# Patient Record
Sex: FEMALE | Race: WHITE | Employment: UNEMPLOYED | URBAN - METROPOLITAN AREA
[De-identification: names, ages, dates, MRNs, and addresses within clinical notes are randomized per-mention and may not be internally consistent; named-entity substitution may affect disease eponyms.]

---

## 2019-10-30 ENCOUNTER — HOSPITAL ENCOUNTER (EMERGENCY)
Age: 44
Discharge: HOME | End: 2019-10-30
Attending: EMERGENCY MEDICINE
Payer: COMMERCIAL

## 2019-10-30 ENCOUNTER — APPOINTMENT (OUTPATIENT)
Dept: VASCULAR SURGERY | Age: 44
End: 2019-10-30
Attending: EMERGENCY MEDICINE
Payer: COMMERCIAL

## 2019-10-30 VITALS
HEART RATE: 67 BPM | TEMPERATURE: 98.5 F | BODY MASS INDEX: 29.44 KG/M2 | SYSTOLIC BLOOD PRESSURE: 98 MMHG | WEIGHT: 160 LBS | OXYGEN SATURATION: 98 % | RESPIRATION RATE: 20 BRPM | HEIGHT: 62 IN | DIASTOLIC BLOOD PRESSURE: 75 MMHG

## 2019-10-30 DIAGNOSIS — I82.622 ACUTE DEEP VEIN THROMBOSIS (DVT) OF LEFT UPPER EXTREMITY, UNSPECIFIED VEIN (HCC): Primary | ICD-10-CM

## 2019-10-30 PROCEDURE — 93971 EXTREMITY STUDY: CPT

## 2019-10-30 PROCEDURE — 99284 EMERGENCY DEPT VISIT MOD MDM: CPT

## 2019-10-30 NOTE — ED PROVIDER NOTES
The history is provided by the patient. No  was used. Arm Pain    This is a new problem. The current episode started more than 2 days ago. The problem occurs constantly. The problem has not changed since onset. The pain is present in the left arm. The pain is at a severity of 7/10. The pain is moderate. Pertinent negatives include no numbness, full range of motion, no stiffness, no tingling, no itching, no back pain and no neck pain. The symptoms are aggravated by movement, palpation and contact. She has tried nothing for the symptoms. The treatment provided no relief. There has been no history of extremity trauma. Past Medical History:   Diagnosis Date    DVT (deep venous thrombosis) (HCC)        Past Surgical History:   Procedure Laterality Date    HX GYN      tubal ligation         History reviewed. No pertinent family history.     Social History     Socioeconomic History    Marital status: Not on file     Spouse name: Not on file    Number of children: Not on file    Years of education: Not on file    Highest education level: Not on file   Occupational History    Not on file   Social Needs    Financial resource strain: Not on file    Food insecurity:     Worry: Not on file     Inability: Not on file    Transportation needs:     Medical: Not on file     Non-medical: Not on file   Tobacco Use    Smoking status: Never Smoker    Smokeless tobacco: Never Used   Substance and Sexual Activity    Alcohol use: Yes     Comment: occ    Drug use: Never    Sexual activity: Not on file   Lifestyle    Physical activity:     Days per week: Not on file     Minutes per session: Not on file    Stress: Not on file   Relationships    Social connections:     Talks on phone: Not on file     Gets together: Not on file     Attends Zoroastrianism service: Not on file     Active member of club or organization: Not on file     Attends meetings of clubs or organizations: Not on file     Relationship status: Not on file    Intimate partner violence:     Fear of current or ex partner: Not on file     Emotionally abused: Not on file     Physically abused: Not on file     Forced sexual activity: Not on file   Other Topics Concern    Not on file   Social History Narrative    Not on file         ALLERGIES: Aspirin and Nsaids (non-steroidal anti-inflammatory drug)    Review of Systems   Constitutional: Negative for activity change, chills and fever. HENT: Negative for nosebleeds, sore throat, trouble swallowing and voice change. Eyes: Negative for visual disturbance. Respiratory: Negative for shortness of breath. Cardiovascular: Negative for chest pain and palpitations. Gastrointestinal: Negative for abdominal pain, constipation, diarrhea and nausea. Genitourinary: Negative for difficulty urinating, dysuria, hematuria and urgency. Musculoskeletal: Positive for myalgias. Negative for back pain, neck pain, neck stiffness and stiffness. Skin: Negative for color change and itching. Allergic/Immunologic: Negative for immunocompromised state. Neurological: Negative for dizziness, tingling, seizures, syncope, weakness, light-headedness, numbness and headaches. Psychiatric/Behavioral: Negative for behavioral problems, confusion, hallucinations, self-injury and suicidal ideas. Vitals:    10/30/19 1637   BP: 130/76   Pulse: 67   Resp: 20   Temp: 98.5 °F (36.9 °C)   SpO2: 95%   Weight: 72.6 kg (160 lb)   Height: 5' 2\" (1.575 m)            Physical Exam   Constitutional: She appears well-developed and well-nourished. No distress. HENT:   Head: Atraumatic. Eyes: EOM are normal.   Neck: No tracheal deviation present. Cardiovascular: Normal rate, regular rhythm and normal heart sounds. Warm and well perfused   Pulmonary/Chest: Effort normal and breath sounds normal. No respiratory distress. Musculoskeletal: Normal range of motion.         Left upper arm: She exhibits tenderness, swelling and edema. She exhibits no bony tenderness. Neurological: She is alert. Coordination normal.   Skin: Skin is warm and dry. She is not diaphoretic. Psychiatric: She has a normal mood and affect. Her behavior is normal. Judgment and thought content normal.   Nursing note and vitals reviewed. MDM      This is a 41-year-old female with past medical history, review of systems, physical exam as above, presenting with complaints of atraumatic left arm pain. Patient states pain began approximately 1 week ago, followed shortly thereafter by swelling, and stating that his \"cool to the touch\". Patient endorses a previous history of DVT, thought provoked by pregnancy, 15 years ago, no longer taking anticoagulants. She denies chest pain or shortness of breath, or other symptoms at this time. Physical exam is remarkable for well-appearing middle-aged female, in no acute distress, with discolored left upper extremity, distal pulse motor and sensation intact, mildly tender to palpation proximal medial aspect. Suspect recurrent DVT, will obtain DVT ultrasound study, reevaluate, and make a disposition.     Procedures

## 2019-10-30 NOTE — ED TRIAGE NOTES
Triage: Monday noticed that her left arm was swollen. Left arm is significantly more swollen that right, cold to the touch arm and fingers. .  + pulses. Hx; blood clot not on anticoagulants.

## 2019-10-30 NOTE — DISCHARGE INSTRUCTIONS
Patient Education        Deep Vein Thrombosis: Care Instructions  Your Care Instructions    A deep vein thrombosis (DVT) is a blood clot in certain veins of the legs, pelvis, or arms. Blood clots in these veins need to be treated because they can get bigger, break loose, and travel through the bloodstream to the lungs. A blood clot in a lung can be life-threatening. The doctor may have given you a blood thinner (anticoagulant). A blood thinner can stop the blood clot from growing larger and prevent new clots from forming. You will need to take a blood thinner for 3 to 6 months or longer. The doctor has checked you carefully, but problems can develop later. If you notice any problems or new symptoms, get medical treatment right away. Follow-up care is a key part of your treatment and safety. Be sure to make and go to all appointments, and call your doctor if you are having problems. It's also a good idea to know your test results and keep a list of the medicines you take. How can you care for yourself at home? · Take your medicines exactly as prescribed. Call your doctor if you think you are having a problem with your medicine. · If you are taking a blood thinner, be sure you get instructions about how to take your medicine safely. Blood thinners can cause serious bleeding problems. · Wear compression stockings if your doctor recommends them. These stockings are tighter at the feet than on the legs. They may reduce pain and swelling in your legs. But there are different types of stockings, and they need to fit right. So your doctor will recommend what you need. · When you sit, use a pillow to raise the arm or leg that has the blood clot. Try to keep it above the level of your heart. When should you call for help? Call 911 anytime you think you may need emergency care.  For example, call if:    · You passed out (lost consciousness).     · You have symptoms of a blood clot in your lung (called a pulmonary embolism). These include:  ? Sudden chest pain. ? Trouble breathing. ? Coughing up blood.    Call your doctor now or seek immediate medical care if:    · You have new or worse trouble breathing.     · You are dizzy or lightheaded, or you feel like you may faint.     · You have symptoms of a blood clot in your arm or leg. These may include:  ? Pain in the arm, calf, back of the knee, thigh, or groin. ? Redness and swelling in the arm, leg, or groin.    Watch closely for changes in your health, and be sure to contact your doctor if:    · You do not get better as expected. Where can you learn more? Go to http://rosemarie-radha.info/. Enter L481 in the search box to learn more about \"Deep Vein Thrombosis: Care Instructions. \"  Current as of: September 26, 2018  Content Version: 12.2  © 5847-6307 [employer], [employer]. Care instructions adapted under license by [employer] (which disclaims liability or warranty for this information). If you have questions about a medical condition or this instruction, always ask your healthcare professional. [place] [address] any warranty or liability for your use of this information.

## 2019-11-02 ENCOUNTER — HOSPITAL ENCOUNTER (EMERGENCY)
Age: 44
Discharge: HOME | End: 2019-11-02
Attending: EMERGENCY MEDICINE
Payer: COMMERCIAL

## 2019-11-02 VITALS
RESPIRATION RATE: 18 BRPM | WEIGHT: 160 LBS | TEMPERATURE: 98.2 F | DIASTOLIC BLOOD PRESSURE: 70 MMHG | SYSTOLIC BLOOD PRESSURE: 120 MMHG | HEIGHT: 62 IN | BODY MASS INDEX: 29.44 KG/M2 | OXYGEN SATURATION: 98 % | HEART RATE: 71 BPM

## 2019-11-02 DIAGNOSIS — N92.0 MENORRHAGIA WITH REGULAR CYCLE: Primary | ICD-10-CM

## 2019-11-02 LAB
ALBUMIN SERPL-MCNC: 3.6 G/DL (ref 3.5–5)
ALBUMIN/GLOB SERPL: 0.9 {RATIO} (ref 1.1–2.2)
ALP SERPL-CCNC: 67 U/L (ref 45–117)
ALT SERPL-CCNC: 14 U/L (ref 12–78)
ANION GAP SERPL CALC-SCNC: 8 MMOL/L (ref 5–15)
AST SERPL-CCNC: 11 U/L (ref 15–37)
BASOPHILS # BLD: 0.1 K/UL (ref 0–0.1)
BASOPHILS NFR BLD: 1 % (ref 0–1)
BILIRUB SERPL-MCNC: 0.2 MG/DL (ref 0.2–1)
BUN SERPL-MCNC: 10 MG/DL (ref 6–20)
BUN/CREAT SERPL: 11 (ref 12–20)
CALCIUM SERPL-MCNC: 9.1 MG/DL (ref 8.5–10.1)
CHLORIDE SERPL-SCNC: 110 MMOL/L (ref 97–108)
CO2 SERPL-SCNC: 24 MMOL/L (ref 21–32)
COMMENT, HOLDF: NORMAL
CREAT SERPL-MCNC: 0.87 MG/DL (ref 0.55–1.02)
DIFFERENTIAL METHOD BLD: NORMAL
EOSINOPHIL # BLD: 0.2 K/UL (ref 0–0.4)
EOSINOPHIL NFR BLD: 2 % (ref 0–7)
ERYTHROCYTE [DISTWIDTH] IN BLOOD BY AUTOMATED COUNT: 13 % (ref 11.5–14.5)
GLOBULIN SER CALC-MCNC: 3.8 G/DL (ref 2–4)
GLUCOSE SERPL-MCNC: 94 MG/DL (ref 65–100)
HCT VFR BLD AUTO: 39.2 % (ref 35–47)
HGB BLD-MCNC: 13 G/DL (ref 11.5–16)
IMM GRANULOCYTES # BLD AUTO: 0 K/UL (ref 0–0.04)
IMM GRANULOCYTES NFR BLD AUTO: 0 % (ref 0–0.5)
LYMPHOCYTES # BLD: 2.5 K/UL (ref 0.8–3.5)
LYMPHOCYTES NFR BLD: 28 % (ref 12–49)
MCH RBC QN AUTO: 29.3 PG (ref 26–34)
MCHC RBC AUTO-ENTMCNC: 33.2 G/DL (ref 30–36.5)
MCV RBC AUTO: 88.3 FL (ref 80–99)
MONOCYTES # BLD: 0.6 K/UL (ref 0–1)
MONOCYTES NFR BLD: 7 % (ref 5–13)
NEUTS SEG # BLD: 5.6 K/UL (ref 1.8–8)
NEUTS SEG NFR BLD: 62 % (ref 32–75)
NRBC # BLD: 0 K/UL (ref 0–0.01)
NRBC BLD-RTO: 0 PER 100 WBC
PLATELET # BLD AUTO: 270 K/UL (ref 150–400)
PMV BLD AUTO: 9.2 FL (ref 8.9–12.9)
POTASSIUM SERPL-SCNC: 3.5 MMOL/L (ref 3.5–5.1)
PROT SERPL-MCNC: 7.4 G/DL (ref 6.4–8.2)
RBC # BLD AUTO: 4.44 M/UL (ref 3.8–5.2)
SAMPLES BEING HELD,HOLD: NORMAL
SODIUM SERPL-SCNC: 142 MMOL/L (ref 136–145)
WBC # BLD AUTO: 9 K/UL (ref 3.6–11)

## 2019-11-02 PROCEDURE — 36415 COLL VENOUS BLD VENIPUNCTURE: CPT

## 2019-11-02 PROCEDURE — 85025 COMPLETE CBC W/AUTO DIFF WBC: CPT

## 2019-11-02 PROCEDURE — 80053 COMPREHEN METABOLIC PANEL: CPT

## 2019-11-02 PROCEDURE — 99283 EMERGENCY DEPT VISIT LOW MDM: CPT

## 2019-11-02 NOTE — ED TRIAGE NOTES
Patient c/o heavy abdominal bleeding with clots x few days, recently started xeralto due to dvt in upper left extremity.

## 2019-11-02 NOTE — ED NOTES
12:56 PM  I have evaluated the patient as the Provider in Triage. I have reviewed Her vital signs and the triage nurse assessment. I have talked with the patient and any available family and advised that I am the provider in triage and have ordered the appropriate study to initiate their work up based on the clinical presentation during my assessment. I have advised that the patient will be accommodated in the Main ED as soon as possible. I have also requested to contact the triage nurse or myself immediately if the patient experiences any changes in their condition during this brief waiting period. Patient recently diagnosed with left upper extremity DVT and started on Xarelto. Now with increased vaginal bleeding with blood clots the size of eggs. Denies any symptoms of anemia.   Mary Sow, KAREEM

## 2019-11-02 NOTE — ED PROVIDER NOTES
38 y.o. female with past medical history significant for DVT and tubal ligation who presents from home with chief complaint of vaginal bleeding. Patient states ~3 days ago she was admitted at [facility] for DVT of left upper extremity, patient was discharged home with Xarelto. Patient notes Miles Rodriges few days before she began Xarelto she was spotting however related it to beginning her period a little early. \" Patient states ~3 days ago she had onset of vaginal bleeding that has not subsided. Patient presents to [facility] ED with persisting vaginal bleeding described as Barrera Baize, going through a maxi-pad every hour, and clot's as big as eggs. \" Patient notes her last DVT was provoked by pregnancy and was ~15 years ago. Patient denies recent sexual intercourse. Patient denies recent injury. Patient denies hx of sx. Patient denies SOB, lightheadedness, dizziness, and chest pain. There are no other acute medical concerns at this time. Social hx: No Tobacco, (+) EtOH use, No illicit drug use. Note written by Lukas Duffy, as dictated by Peter Vincent, DO 2:00 PM     The history is provided by the patient. No  was used. Past Medical History:   Diagnosis Date    DVT (deep venous thrombosis) (HCC)        Past Surgical History:   Procedure Laterality Date    HX GYN      tubal ligation         No family history on file.     Social History     Socioeconomic History    Marital status:      Spouse name: Not on file    Number of children: Not on file    Years of education: Not on file    Highest education level: Not on file   Occupational History    Not on file   Social Needs    Financial resource strain: Not on file    Food insecurity:     Worry: Not on file     Inability: Not on file    Transportation needs:     Medical: Not on file     Non-medical: Not on file   Tobacco Use    Smoking status: Never Smoker    Smokeless tobacco: Never Used   Substance and Sexual Activity    Alcohol use: Yes     Comment: occ    Drug use: Never    Sexual activity: Not on file   Lifestyle    Physical activity:     Days per week: Not on file     Minutes per session: Not on file    Stress: Not on file   Relationships    Social connections:     Talks on phone: Not on file     Gets together: Not on file     Attends Mandaeism service: Not on file     Active member of club or organization: Not on file     Attends meetings of clubs or organizations: Not on file     Relationship status: Not on file    Intimate partner violence:     Fear of current or ex partner: Not on file     Emotionally abused: Not on file     Physically abused: Not on file     Forced sexual activity: Not on file   Other Topics Concern    Not on file   Social History Narrative    Not on file         ALLERGIES: Aspirin and Nsaids (non-steroidal anti-inflammatory drug)    Review of Systems   Constitutional: Negative for appetite change, fatigue and fever. HENT: Negative for ear pain, sore throat and trouble swallowing. Eyes: Negative for pain, discharge and visual disturbance. Respiratory: Negative for cough, chest tightness, shortness of breath and wheezing. Cardiovascular: Negative for chest pain and palpitations. Gastrointestinal: Negative for abdominal pain, blood in stool, nausea and vomiting. Genitourinary: Positive for vaginal bleeding. Negative for difficulty urinating, dysuria, flank pain and hematuria. Musculoskeletal: Negative for myalgias. Skin: Negative for color change and rash. Neurological: Negative for dizziness, weakness and light-headedness. Psychiatric/Behavioral: Negative for confusion. Vitals:    11/02/19 1241 11/02/19 1551   BP: 141/76 120/70   Pulse: 71    Resp: 18    Temp: 98.2 °F (36.8 °C)    SpO2: 98%    Weight: 72.6 kg (160 lb)    Height: 5' 2\" (1.575 m)             Physical Exam   Constitutional: She is oriented to person, place, and time. She appears well-developed and well-nourished.  No distress. HENT:   Head: Normocephalic and atraumatic. Nose: Nose normal.   Mouth/Throat: Oropharynx is clear and moist.   Eyes: Pupils are equal, round, and reactive to light. Conjunctivae and EOM are normal. No scleral icterus. Neck: Normal range of motion. Neck supple. No JVD present. No tracheal deviation present. No thyromegaly present. No carotid bruits noted. Cardiovascular: Normal rate, regular rhythm, normal heart sounds and intact distal pulses. Exam reveals no gallop. No murmur heard. Pulmonary/Chest: Effort normal and breath sounds normal. No respiratory distress. She has no wheezes. She has no rales. She exhibits no tenderness. Abdominal: Soft. Bowel sounds are normal. She exhibits no distension and no mass. There is no tenderness. There is no rebound and no guarding. Genitourinary:   Genitourinary Comments: Deferred   Musculoskeletal: Normal range of motion. She exhibits no edema, tenderness or deformity. Lymphadenopathy:     She has no cervical adenopathy. Neurological: She is alert and oriented to person, place, and time. She has normal reflexes. No cranial nerve deficit. Coordination normal.   Skin: Skin is warm and dry. Capillary refill takes less than 2 seconds. No rash noted. She is not diaphoretic. No erythema. Psychiatric: She has a normal mood and affect. Her behavior is normal. Judgment and thought content normal.   Nursing note and vitals reviewed. Note written by Lukas Addison, as dictated by No att. providers found 2:00 PM      MDM  Number of Diagnoses or Management Options  Menorrhagia with regular cycle:         VITAL SIGNS:  No data found. IMAGING:  No orders to display         Medications During Visit:  Medications - No data to display      DECISION MAKING:  Gretta Krishnan is a 38 y.o. female who comes in as above. Here, patient remained stable normal hemoglobin hematocrit levels. I did speak with OB/GYN for their recommendations.   At this time the patient will need to continue taking her blood thinner and follow-up as an outpatient with her bleeding. She is asymptomatic and has normal vitals and feel safe to discharge the patient home with reassurance that the bleeding will most likely continue but it will eventually stop on her menses is over. Strict return precautions are provided. Patient is agreeable to these and she is ambulatory at the disposition. She is encouraged to return promptly for worsening of any symptoms. I had this conversation with the patient and she is agreeable to this. She understands the diagnosis and all questions have been answered      IMPRESSION:  1. Menorrhagia with regular cycle        DISPOSITION:  Discharged      Discharge Medication List as of 11/2/2019  3:31 PM           Follow-up Information     Follow up With Specialties Details Why Contact Info    Your [facility]  Schedule an appointment as soon as possible for a visit               The patient is asked to follow-up with their primary care provider in the next several days. They are to call tomorrow for an appointment. The patient is asked to return promptly for any increased concerns or worsening of symptoms. They can return to this emergency department or any other emergency department. Procedures  PROGRESS NOTE:  3:31 PM  Provider spoke to OBGYN who stated there is no reason for pt to change any medication, or start any new medications. OBGYN wants pt to f/u with [facility] next week. Pt understands and agrees with plan of care.

## 2019-11-04 ENCOUNTER — TELEPHONE (OUTPATIENT)
Dept: ONCOLOGY | Age: 44
End: 2019-11-04

## 2019-11-04 NOTE — TELEPHONE ENCOUNTER
Patient went to OB-Gyn today and got started on new medication. Megrace (low dose progesterone ) to help with the menstrual bleeding and clotting. She was instructed to by the OB-GYN office to call our office.        # [phone]

## 2019-11-06 NOTE — TELEPHONE ENCOUNTER
[address] at [facility]  [phone]      11/06/19 4:27 PM Spoke with patient. Offered her sooner appointment, per Cesar Vann. Rescheduled for 11/07 at 1 PM. No further questions or concerns at this time.

## 2019-11-07 ENCOUNTER — OFFICE VISIT (OUTPATIENT)
Dept: ONCOLOGY | Age: 44
End: 2019-11-07

## 2019-11-07 VITALS
WEIGHT: 161 LBS | BODY MASS INDEX: 29.63 KG/M2 | HEIGHT: 62 IN | DIASTOLIC BLOOD PRESSURE: 80 MMHG | OXYGEN SATURATION: 99 % | TEMPERATURE: 98.2 F | SYSTOLIC BLOOD PRESSURE: 115 MMHG | HEART RATE: 86 BPM | RESPIRATION RATE: 16 BRPM

## 2019-11-07 DIAGNOSIS — N92.0 MENORRHAGIA WITH REGULAR CYCLE: ICD-10-CM

## 2019-11-07 DIAGNOSIS — I82.622 ARM DVT (DEEP VENOUS THROMBOEMBOLISM), ACUTE, LEFT (HCC): Primary | ICD-10-CM

## 2019-11-07 DIAGNOSIS — Z86.718 HISTORY OF DVT (DEEP VEIN THROMBOSIS): ICD-10-CM

## 2019-11-07 RX ORDER — MEGESTROL ACETATE 40 MG/1
TABLET ORAL DAILY
COMMUNITY
Start: 2019-11-04 | End: 2019-11-14 | Stop reason: ALTCHOICE

## 2019-11-07 NOTE — PROGRESS NOTES
[address] [facility] at [address] [place] [address]  [phone]    Hematology / Oncology Consult    Reason for Visit:   Elonda Sicard is a 38 y.o. female who is seen in consultation at the request of Dr. Jess Long for evaluation of DVT. History of Present Illness:   Monica Reeder is a 38 y.o. female with h/o DVT comes in for management of acute DVT. Patient presented to [address] [place] [address] ED on 10/30/19 with left arm pain, swelling and coldness (3 day h/o). Patient did have a prior DVT in LLE while 7 mo pregnant with her daughter 15 yrs ago. At that time, she was treated with Lovenox throughout most of the pregnancy, then transitioned to Heparin injections the last 3 weeks of pregnancy. After delivery, she was placed on Warfarin for 6 months. Given her prior h/o DVT during pregnancy approx 15 yrs ago, patient underwent ultrasound doppler exam, which was notable for left subclavian and axillary vein DVT. No recent surgeries, arm trauma, OCPs, 8 hr long trips. No recent infections. She had a corticosteroid injection in her C-spine region midline per patient. She does exercise regularly since June 2019. She was started on Xarelto. She also has a h/o tubal ligation performed 14 yrs ago. Prior to starting Xarelto, she had a few days of spotting. However, after starting Xarelto, she developed persistent heavy vaginal bleeding requiring 1 pad every hour. She was started on Megace by Gyn on 11/4/19. Her menstrual bleeding has decreased slightly, but still having clotting and bleeding, now requiring pad change every 2 hours. Mother had vascular problems including atherosclerosis, but did not have blood clots.      Past Medical History:   Diagnosis Date    DVT (deep venous thrombosis) (HCC)       Past Surgical History:   Procedure Laterality Date    HX GYN      tubal ligation      Social History     Tobacco Use    Smoking status: Never Smoker    Smokeless tobacco: Never Used   Substance Use Topics    Alcohol use: Yes     Comment: occ      No family history on file. Current Outpatient Medications   Medication Sig    rivaroxaban (XARELTO) 15 mg (42)- 20 mg (9) DsPk Take 15mg tablet twice daily for days 1-21 followed by 20mg tablet once daily for days 22-30. No current facility-administered medications for this visit. Allergies   Allergen Reactions    Aspirin Shortness of Breath    Nsaids (Non-Steroidal Anti-Inflammatory Drug) Shortness of Breath        Review of Systems: A complete review of systems was obtained, negative except as described above. Physical Exam:     Visit Vitals  /80   Pulse 86   Temp 98.2 °F (36.8 °C) (Oral)   Resp 16   Ht 5' 2\" (1.575 m)   Wt 161 lb (73 kg)   LMP 10/30/2019   SpO2 99%   BMI 29.45 kg/m²     ECOG PS: 0  General: Well developed, no acute distress  Eyes: PERRLA, EOMI, anicteric sclerae  HENT: Atraumatic, OP clear, TMs intact without erythema  Neck: Supple  Lymphatic: No cervical, supraclavicular, axillary or inguinal adenopathy  Respiratory: CTAB, normal respiratory effort  CV: Normal rate, regular rhythm, no murmurs. Left arm swelling along with mildly prominent veins in left chest and axilla. GI: Soft, nontender, nondistended, no masses, no hepatomegaly, no splenomegaly  MS: Normal gait and station. Digits without clubbing or cyanosis. Skin: No rashes, ecchymoses, or petechiae. Normal temperature, turgor, and texture. Neuro/Psych: Alert, oriented. 5/5 strength in all 4 extremities. Appropriate affect, normal judgment/insight. Results:     Lab Results   Component Value Date/Time    WBC 9.0 11/02/2019 12:49 PM    HGB 13.0 11/02/2019 12:49 PM    HCT 39.2 11/02/2019 12:49 PM    PLATELET 510 10/56/4324 12:49 PM    MCV 88.3 11/02/2019 12:49 PM    ABS.  NEUTROPHILS 5.6 11/02/2019 12:49 PM     Lab Results   Component Value Date/Time    Sodium 142 11/02/2019 12:49 PM    Potassium 3.5 11/02/2019 12:49 PM    Chloride 110 (H) 11/02/2019 12:49 PM    CO2 24 11/02/2019 12:49 PM    Glucose 94 11/02/2019 12:49 PM    BUN 10 11/02/2019 12:49 PM    Creatinine 0.87 11/02/2019 12:49 PM    GFR est AA >60 11/02/2019 12:49 PM    GFR est non-AA >60 11/02/2019 12:49 PM    Calcium 9.1 11/02/2019 12:49 PM     Lab Results   Component Value Date/Time    Bilirubin, total 0.2 11/02/2019 12:49 PM    ALT (SGPT) 14 11/02/2019 12:49 PM    AST (SGOT) 11 (L) 11/02/2019 12:49 PM    Alk. phosphatase 67 11/02/2019 12:49 PM    Protein, total 7.4 11/02/2019 12:49 PM    Albumin 3.6 11/02/2019 12:49 PM    Globulin 3.8 11/02/2019 12:49 PM     No results found for: IRON, FE, TIBC, IBCT, PSAT, FERR    No results found for: B12LT, FOL, RBCF  No results found for: TSH, TSH2, TSH3, TSHP, TSHEXT  No results found for: HAMAT, HAAB, HABT, HAAT, HBSAG, HBSB, HBSAT, HBABN, HBCM, HBCAB, HBCAT, Afshin Boykin, [facility], [address] [place] [address], [address] [place] [address], [address] [facility], [address] [place] [address], [address] [place] [address], [id number], [id number], [address] [place] [address], [id number], [id number], [id number], HCGAT      Imaging:     10/30/19 upper extremity doppler: Left upper extremity venous duplex is positive for acute occlusive deep venous thrombosis involving subclavian and axillary veins. Right common femoral vein is thrombus free. Assessment & Plan:   Monica Dey is a 38 y.o. female comes in with DVT. 1. LUE DVT: Acute DVT involving left subclavian and axillary veins. This is an unprovoked event and her 2nd lifetime thrombotic event, which will require lifelong anticoagulation. I discussed risks/benefits of continuing on anticoagulation indefinitely. I also discussed that given her menorrhagia, we could try switching to Eliquis which has a slightly lower risk of bleeding. She has already completed 1 week of the Xarelto. Once she gets down to once daily dosing, she may find her menorrhagia improves. -- Continue anticoagulation indefinitely  -- If significant bleeding while on Xarelto, could try switching to Eliquis 5mg bid. Pt will call to let us know if she wants to switch to Eliquis.   -- Return in 6 weeks for f/u    2. H/o LLE DVT: Attributed to pregnancy and completed 6 months of anticoagulation at that time. No family h/o thrombosis. 3. Menorrhagia: Exacerbated by Xarelto, but labs on 11/2/19 show normal CBC without anemia. The recent vaginal bleeding was thought to be 2/2 menstrual cycle with some worsening due to Xarelto and should stop once her menses finishes. -- f/u with Gyn.   -- Check CBC with iron profile in 4-6 weeks    I appreciate the opportunity to participate in Ms. Monica Arias's care.     Signed By: Vinayak Mello MD     November 7, 2019

## 2019-11-14 ENCOUNTER — OFFICE VISIT (OUTPATIENT)
Dept: INTERNAL MEDICINE CLINIC | Age: 44
End: 2019-11-14

## 2019-11-14 VITALS
BODY MASS INDEX: 29.63 KG/M2 | WEIGHT: 161 LBS | HEIGHT: 62 IN | DIASTOLIC BLOOD PRESSURE: 71 MMHG | OXYGEN SATURATION: 99 % | RESPIRATION RATE: 18 BRPM | TEMPERATURE: 99.2 F | HEART RATE: 63 BPM | SYSTOLIC BLOOD PRESSURE: 117 MMHG

## 2019-11-14 DIAGNOSIS — R00.2 PALPITATIONS: ICD-10-CM

## 2019-11-14 DIAGNOSIS — N92.0 MENORRHAGIA WITH REGULAR CYCLE: ICD-10-CM

## 2019-11-14 DIAGNOSIS — Z00.00 ROUTINE ADULT HEALTH MAINTENANCE: Primary | ICD-10-CM

## 2019-11-14 DIAGNOSIS — I82.A12 ACUTE DEEP VEIN THROMBOSIS (DVT) OF AXILLARY VEIN OF LEFT UPPER EXTREMITY (HCC): ICD-10-CM

## 2019-11-14 DIAGNOSIS — M54.2 NECK PAIN: ICD-10-CM

## 2019-11-14 DIAGNOSIS — Z00.00 ROUTINE ADULT HEALTH MAINTENANCE: ICD-10-CM

## 2019-11-14 PROBLEM — I82.409 DVT (DEEP VENOUS THROMBOSIS) (HCC): Status: ACTIVE | Noted: 2019-11-14

## 2019-11-14 PROBLEM — J45.909 ASTHMA: Status: ACTIVE | Noted: 2019-11-14

## 2019-11-14 PROBLEM — G89.29 CHRONIC PAIN: Status: ACTIVE | Noted: 2019-11-14

## 2019-11-14 RX ORDER — GABAPENTIN 300 MG/1
CAPSULE ORAL
COMMUNITY
Start: 2019-08-23 | End: 2019-11-14

## 2019-11-14 RX ORDER — MEGESTROL ACETATE 40 MG/1
TABLET ORAL
COMMUNITY
End: 2019-11-14 | Stop reason: SDUPTHER

## 2019-11-14 RX ORDER — CHOLECALCIFEROL (VITAMIN D3) 125 MCG
CAPSULE ORAL
COMMUNITY
Start: 2014-11-13

## 2019-11-14 RX ORDER — ALBUTEROL SULFATE 0.83 MG/ML
SOLUTION RESPIRATORY (INHALATION)
COMMUNITY
Start: 2007-08-07 | End: 2019-11-14 | Stop reason: ALTCHOICE

## 2019-11-14 NOTE — PROGRESS NOTES
History of Present Illness   Chief Complaint   Establish care    Afia Barnes is a 38 y.o. female     Possible thyroid problempatient reports that she has a history of Graves' disease that was treated with methimazole and back in 2010. Patient reports that in the past 2 weeks, after she was diagnosed with a left upper extremity DVT and started on Xarelto, she has noticed that she has had more throat tenderness anteriorly, fatigue, tremor, and difficulty swallowing. Also reports that she feels like she hears a whooshing sound in her ears. Endorses palpitations but when she checks her heart rate she feels it's between 46 and 93. Reports that her resting heart rate is usually in the 50s. Feels short of breath that she is walking across the room. Denies any chest pain, PND, orthopnea, lower extremity swelling. Does report some occasional wheezing. Although she feels like the wheezing is \"her normal.  \"Denies any recent illnesses. Menorrhagia improving    Neck painfrom DDD. Had a cortisol shot in October. Review of Systems   Constitutional: Negative for chills and fever. HENT: Positive for tinnitus. Negative for hearing loss. Eyes: Positive for blurred vision. Respiratory: Negative for shortness of breath. Cardiovascular: Positive for palpitations. Negative for chest pain. Gastrointestinal: Negative for abdominal pain, blood in stool, constipation, diarrhea, melena, nausea and vomiting. Genitourinary: Negative for dysuria and hematuria. Musculoskeletal: Positive for neck pain. Skin: Negative for rash. Neurological: Negative for headaches.         Past Medical History     Allergies   Allergen Reactions    Aspirin Shortness of Breath    Nsaids (Non-Steroidal Anti-Inflammatory Drug) Shortness of Breath        Current Outpatient Medications   Medication Sig    cholecalciferol (VITAMIN D3) 5,000 unit capsule cholecalciferol (vitamin D3) 5,000 unit capsule    CANNABIDIOL, CBD, EXTRACT PO Take  by mouth two (2) times a day.  rivaroxaban (XARELTO) 15 mg (42)- 20 mg (9) DsPk Take 15mg tablet twice daily for days 1-21 followed by 20mg tablet once daily for days 22-30. (Patient taking differently: Take  by mouth. Take 15mg tablet twice daily for days 1-21 followed by 20mg tablet once daily for days 22-30.)     No current facility-administered medications for this visit. Patient Active Problem List   Diagnosis Code    DVT (deep venous thrombosis) ([facility]) I82.409    Asthma J45.909    Chronic pain G89.29    Menorrhagia with regular cycle N92.0     Past Surgical History:   Procedure Laterality Date    HX GYN      tubal ligation      Social History     Tobacco Use    Smoking status: Never Smoker    Smokeless tobacco: Never Used   Substance Use Topics    Alcohol use: Yes     Alcohol/week: 1.0 standard drinks     Types: 1 Standard drinks or equivalent per week     Comment: occ      Family History   Problem Relation Age of Onset    Heart Disease Mother 77    Hypertension Mother     Hypertension Father         Physical Exam   Vitals:       Visit Vitals  /71 (BP 1 Location: Right arm, BP Patient Position: Sitting)   Pulse 63   Temp 99.2 °F (37.3 °C) (Oral)   Resp 18   Ht 5' 2\" (1.575 m)   Wt 161 lb (73 kg)   LMP 10/30/2019   SpO2 99%   BMI 29.45 kg/m²        Physical Exam   Constitutional: She is oriented to person, place, and time and well-developed, well-nourished, and in no distress. No distress. HENT:   Right Ear: External ear normal.   Left Ear: External ear normal.   Mouth/Throat: Oropharynx is clear and moist.   Eyes: Conjunctivae and EOM are normal.   Neck: Neck supple. No thyromegaly present. Mild tenderness to palpation of her right anterior neck. No gross swelling noted   Cardiovascular: Normal rate, regular rhythm and intact distal pulses. Exam reveals no gallop and no friction rub. No murmur heard. Pulmonary/Chest: No respiratory distress. She has no wheezes.  She has no rales. Abdominal: Soft. Bowel sounds are normal. She exhibits no distension. There is no hepatosplenomegaly. There is no tenderness. Lymphadenopathy:     She has no cervical adenopathy. Neurological: She is alert and oriented to person, place, and time. Skin: Skin is warm. No rash noted. Psychiatric: Affect and judgment normal.        Assessment and Plan   Diagnoses and all orders for this visit:    1. Routine adult health maintenance  -     HEMOGLOBIN A1C WITH EAG; Future  -     LIPID PANEL; Future  Sees OB/GYN. Instructed patient that she can call us for her OB/GYN for a Pap      2. Palpitations  -     TSH 3RD GENERATION; Future  -     T4, FREE; Future  -     AMB POC EKG ROUTINE W/ 12 LEADS, INTER & REP   Total t3  Patient is a 58-year-old female with a history of Graves' disease and a recently diagnosed left upper extremity DVT who presents to clinic with a 2-week history of anterior neck pain, palpitations, tremors. Possibly thyroid related. We will check her thyroid studies. If positive, would consider getting further imaging and possibly an endocrine consult. Does not appear that Xarelto was associated with any of the symptoms. EKG with sinus rhythm. 3. Acute deep vein thrombosis (DVT) of axillary vein of left upper extremity (HCC)  Assessment & Plan:    Key Peripheral Vascular Disease Meds             rivaroxaban (XARELTO) 15 mg (42)- 20 mg (9) DsPk Take 15mg tablet twice daily for days 1-21 followed by 20mg tablet once daily for days 22-30. Lab Results   Component Value Date/Time    WBC 9.0 11/02/2019 12:49 PM    HGB 13.0 11/02/2019 12:49 PM    HCT 39.2 11/02/2019 12:49 PM    PLATELET 833 44/70/6004 12:49 PM    Creatinine 0.87 11/02/2019 12:49 PM    BUN 10 11/02/2019 12:49 PM     Continue Xarelto. Followed by heme-onc. This was a unprovoked DVT in her second in her lifetime, so she is a candidate for lifelong anticoagulation.     4. Menorrhagia with regular cycle  -     CBC WITH AUTOMATED DIFF; Future  Improved    5. Neck pain  Stable    Benefits, risks, possible drug interactions, and side effects of all new medications were reviewed with the patient. Pt verbalized understanding.     Return to clinic: Pending results of thyroid studies    Theo New MD  [facility]  11/14/2019    Future Appointments   Date Time Provider Yohana Dodson   12/17/2019 11:15 AM Matthew Starks NP [address] [place] [address], [address]

## 2019-11-14 NOTE — ASSESSMENT & PLAN NOTE
Key Peripheral Vascular Disease Meds             rivaroxaban (XARELTO) 15 mg (42)- 20 mg (9) DsPk Take 15mg tablet twice daily for days 1-21 followed by 20mg tablet once daily for days 22-30.         Lab Results   Component Value Date/Time    WBC 9.0 11/02/2019 12:49 PM    HGB 13.0 11/02/2019 12:49 PM    HCT 39.2 11/02/2019 12:49 PM    PLATELET 368 20/65/4624 12:49 PM    Creatinine 0.87 11/02/2019 12:49 PM    BUN 10 11/02/2019 12:49 PM

## 2019-11-15 ENCOUNTER — TELEPHONE (OUTPATIENT)
Dept: INTERNAL MEDICINE CLINIC | Age: 44
End: 2019-11-15

## 2019-11-15 DIAGNOSIS — D50.8 OTHER IRON DEFICIENCY ANEMIA: Primary | ICD-10-CM

## 2019-11-15 LAB
ABSOLUTE BANDS, 67058: ABNORMAL
ABSOLUTE BLASTS: ABNORMAL
ABSOLUTE METAMYELOCYTES, 900360: ABNORMAL
ABSOLUTE MYELOCYTES: ABNORMAL
ABSOLUTE NRBC,ANRBC: ABNORMAL
ABSOLUTE PROMYELOCYTES: ABNORMAL
BANDS,BANDS: ABNORMAL
BASOPHILS # BLD: 60 CELLS/UL (ref 0–200)
BASOPHILS NFR BLD: 0.8 %
BLASTS,BLAST: ABNORMAL
CHOL/HDL RATIO,CHHDX: 3.8 (CALC)
CHOLEST SERPL-MCNC: 192 MG/DL
COMMENT(S): ABNORMAL
EAG (MG/DL),9916804: 88 (CALC)
EAG (MMOL/L),9916805: 4.9 (CALC)
EOSINOPHIL # BLD: 83 CELLS/UL (ref 15–500)
EOSINOPHIL NFR BLD: 1.1 %
ERYTHROCYTE [DISTWIDTH] IN BLOOD BY AUTOMATED COUNT: 12.7 % (ref 11–15)
HBA1C MFR BLD HPLC: 4.7 % OF TOTAL HGB
HCT VFR BLD AUTO: 26.2 % (ref 35–45)
HDLC SERPL-MCNC: 51 MG/DL
HGB BLD-MCNC: 8.7 G/DL (ref 11.7–15.5)
LDL-CHOLESTEROL: 125 MG/DL (CALC)
LYMPHOCYTES # BLD: 1950 CELLS/UL (ref 850–3900)
LYMPHOCYTES NFR BLD: 26 %
MCH RBC QN AUTO: 29 PG (ref 27–33)
MCHC RBC AUTO-ENTMCNC: 33.2 G/DL (ref 32–36)
MCV RBC AUTO: 87.3 FL (ref 80–100)
METAMYELOCYTES,METAS: ABNORMAL
MONOCYTES # BLD: 420 CELLS/UL (ref 200–950)
MONOCYTES NFR BLD: 5.6 %
MYELOCYTES,MYELO: ABNORMAL
NEUTROPHILS # BLD AUTO: 4988 CELLS/UL (ref 1500–7800)
NEUTROPHILS # BLD: 66.5 %
NON-HDL CHOLESTEROL, 011976: 141 MG/DL (CALC)
NRBC: ABNORMAL
PLATELET # BLD AUTO: 411 THOUSAND/UL (ref 140–400)
PMV BLD AUTO: 9.7 FL (ref 7.5–12.5)
PROMYELOCYTES,PRO: ABNORMAL
RBC # BLD AUTO: 3 MILLION/UL (ref 3.8–5.1)
REACTIVE LYMPHS: ABNORMAL
T3 TOTAL,TT3: 99 NG/DL (ref 76–181)
T4 FREE SERPL-MCNC: 1.2 NG/DL (ref 0.8–1.8)
TRIGL SERPL-MCNC: 65 MG/DL (ref ?–150)
TSH SERPL DL<=0.005 MIU/L-ACNC: 1.13 MIU/L
WBC # BLD AUTO: 7.5 THOUSAND/UL (ref 3.8–10.8)

## 2019-11-15 RX ORDER — FERROUS SULFATE 325(65) MG
325 TABLET, DELAYED RELEASE (ENTERIC COATED) ORAL EVERY OTHER DAY
Qty: 90 TAB | Refills: 1 | Status: SHIPPED | OUTPATIENT
Start: 2019-11-15 | End: 2020-02-13

## 2019-11-15 NOTE — PROGRESS NOTES
LDL mildly elevated at 125. Thyroid studies normal.  Hemoglobin is 8.7 from 13 2 weeks ago    Recommend starting iron, getting a blood transfusion, and talking to OB/GYN for plan as she may have significant bleeding every time she has a cycle. I do not believe any alternative anticoagulation would decrease her risk of bleeding. If in the future, she has significant bleeding with menstrual cycles, we may need to consider stopping anticoagulation. IVC filter? Belkis Nunez, please call the pt to tell her that her hemoglobin is 8.7 from 13, which explains most of her symptoms except for her sore throat (I'm not sure why she has a sore throat). Her thyroid studies were normal.  I also recommend starting iron supplements which I have sent to her pharmacy. She should also talk to her OB/GYN about ways to help prevent significant bleeding with further menstrual cycles. Please tell her to make an appointment with me after she has her transfusion so that we can recheck.

## 2019-11-15 NOTE — TELEPHONE ENCOUNTER
Spoke to patient. Made an appointment for her to come in Monday morning to review lab work and discuss a treatment plan. Patient also said to advise the Doctor her menstrual cycle has returned. Patient was told a prescription for Iron was sent to her pharmacy and to start taking it TODAY.  She also gave the information of her OBGYN ([facility])     Patient verbalized understanding

## 2019-11-18 ENCOUNTER — HOSPITAL ENCOUNTER (OUTPATIENT)
Dept: LAB | Age: 44
Discharge: HOME | End: 2019-11-18

## 2019-11-18 ENCOUNTER — OFFICE VISIT (OUTPATIENT)
Dept: INTERNAL MEDICINE CLINIC | Age: 44
End: 2019-11-18

## 2019-11-18 ENCOUNTER — TELEPHONE (OUTPATIENT)
Dept: INTERNAL MEDICINE CLINIC | Age: 44
End: 2019-11-18

## 2019-11-18 VITALS
RESPIRATION RATE: 12 BRPM | HEART RATE: 66 BPM | OXYGEN SATURATION: 99 % | DIASTOLIC BLOOD PRESSURE: 80 MMHG | WEIGHT: 160.4 LBS | TEMPERATURE: 98.2 F | HEIGHT: 62 IN | SYSTOLIC BLOOD PRESSURE: 115 MMHG | BODY MASS INDEX: 29.52 KG/M2

## 2019-11-18 DIAGNOSIS — R47.02 DYSPHASIA: ICD-10-CM

## 2019-11-18 DIAGNOSIS — D64.9 ANEMIA, UNSPECIFIED TYPE: Primary | ICD-10-CM

## 2019-11-18 DIAGNOSIS — G62.9 NEUROPATHY: ICD-10-CM

## 2019-11-18 DIAGNOSIS — D64.9 ANEMIA, UNSPECIFIED TYPE: ICD-10-CM

## 2019-11-18 LAB
ABO + RH BLD: NORMAL
BLOOD BANK CMNT PATIENT-IMP: NORMAL
BLOOD GROUP ANTIBODIES SERPL: NORMAL
SPECIMEN EXP DATE BLD: NORMAL

## 2019-11-18 RX ORDER — SODIUM CHLORIDE 9 MG/ML
250 INJECTION, SOLUTION INTRAVENOUS AS NEEDED
Status: DISPENSED | OUTPATIENT
Start: 2019-11-18 | End: 2019-11-19

## 2019-11-18 NOTE — PROGRESS NOTES
Subjective   Chief Complaint   Lab follow-up    Monica Rizzo is a 38 y.o. female     [address] work drawn at her last visit showed a hemoglobin of 8.7 from 13 2 weeks ago. Most likely explains a lot of her symptoms. Discussed getting a blood transfusion which the patient is agreeable to. Neuropathy, facial numbness, vision changes, general pain patient concerned that this constellation of symptoms may be indicative of multiple sclerosis. All of these issues have been an issue for a few years. Wondering what can be done to help rule out this diagnosis. Unsure what makes it worse. Globus sensation reports some difficulty swallowing. Has tried Claritin. Does not feel like she has any postnasal drip. Review of Systems   Constitutional: Negative for chills and fever. Respiratory: Positive for shortness of breath. Cardiovascular: Negative for chest pain. Objective   Vitals:       Visit Vitals  /80 (BP 1 Location: Right arm, BP Patient Position: Sitting)   Pulse 66   Temp 98.2 °F (36.8 °C) (Oral)   Resp 12   Ht 5' 2\" (1.575 m)   Wt 160 lb 6.4 oz (72.8 kg)   LMP 10/30/2019   SpO2 99%   BMI 29.34 kg/m²        Physical Exam   Constitutional: She is well-developed, well-nourished, and in no distress. No distress. Cardiovascular: Normal rate. Pulmonary/Chest: Effort normal.   Skin: There is pallor. Assessment and Plan   Diagnoses and all orders for this visit:    1. Anemia, unspecified type  -     TYPE & SCREEN; Future  We will plan for 1 unit transfusion. Has follow-up with hematology next month where she will get a repeat CBC. Will follow-up with me a month after that with likely repeat CBC to ensure that her hemoglobin is improving. Instructed patient to talk to her OB/GYN about possible ways to minimize bleeding with her menstrual cycles. 2. Neuropathy  -     REFERRAL TO NEUROLOGY  Pt concerned about MS due to neuropathy, vision issues, and pain    3. Dysphasia  Recommend ENT consult. Patient reports that she alreafsergio has an ENT doctor. Will make an appointment.     Return to clinic: 2 months for anemia    Kelvin Carmichael MD  [facility]  11/18/2019    Future Appointments   Date Time Provider Yohana Dodson   12/17/2019 11:15 AM Dennis Starks NP [address] [place] [address],  [place] [address]

## 2019-11-18 NOTE — TELEPHONE ENCOUNTER
Spoke to patient, advised labs (Type and Screen)  should be ready later this evening per . Will let her know if there is any problems. Patient verbalized understanding.

## 2019-11-18 NOTE — TELEPHONE ENCOUNTER
----- Message from Cristhian Arreaga sent at 11/18/2019  3:02 PM EST -----  Regarding: Dr. Farheen Mensah: [phone]  Caller's first and last name: n/a  Reason for call: Pt called to make sure that practice will send over the results of her blood work to [address] [place] [address] required yes/no and why: yes   Best contact number(s): [phone]  Details to clarify the request: n/a

## 2019-11-19 ENCOUNTER — HOSPITAL ENCOUNTER (OUTPATIENT)
Dept: INFUSION THERAPY | Age: 44
Discharge: HOME | End: 2019-11-19
Payer: COMMERCIAL

## 2019-11-19 ENCOUNTER — HOSPITAL ENCOUNTER (OUTPATIENT)
Dept: INFUSION THERAPY | Age: 44
End: 2019-11-19

## 2019-11-19 VITALS
OXYGEN SATURATION: 99 % | WEIGHT: 161.1 LBS | SYSTOLIC BLOOD PRESSURE: 110 MMHG | RESPIRATION RATE: 16 BRPM | BODY MASS INDEX: 29.47 KG/M2 | HEART RATE: 74 BPM | TEMPERATURE: 97.4 F | DIASTOLIC BLOOD PRESSURE: 64 MMHG

## 2019-11-19 PROCEDURE — 36430 TRANSFUSION BLD/BLD COMPNT: CPT

## 2019-11-19 PROCEDURE — P9016 RBC LEUKOCYTES REDUCED: HCPCS

## 2019-11-19 PROCEDURE — 86900 BLOOD TYPING SEROLOGIC ABO: CPT

## 2019-11-19 PROCEDURE — 36415 COLL VENOUS BLD VENIPUNCTURE: CPT

## 2019-11-19 PROCEDURE — 77030013169 SET IV BLD ICUM -A

## 2019-11-19 PROCEDURE — 86920 COMPATIBILITY TEST SPIN: CPT

## 2019-11-19 RX ORDER — SODIUM CHLORIDE 9 MG/ML
250 INJECTION, SOLUTION INTRAVENOUS AS NEEDED
Status: DISPENSED | OUTPATIENT
Start: 2019-11-19 | End: 2019-11-19

## 2019-11-19 NOTE — PROGRESS NOTES
Pt arrived at [facility]  and in no distress for transfusion of 1 units PRBCs. Assessment completed, no new complaints voiced. PIV accessed without difficulty. Good blood return noted. Signs/symptoms of adverse blood reaction discussed with pt, voiced understanding. Patient Vitals for the past 12 hrs:   Temp Pulse Resp BP SpO2   11/19/19 1352 97.4 °F (36.3 °C) 74 16 110/64    11/19/19 1252 98.4 °F (36.9 °C) 69 16 108/60    11/19/19 1152 98.2 °F (36.8 °C) 63 16 109/56    11/19/19 1122 99.1 °F (37.3 °C) 63 16 110/59    11/19/19 1107 97.9 °F (36.6 °C) 60 18 104/64    11/19/19 1050 97.9 °F (36.6 °C) 67 16 111/60    11/19/19 0802 98 °F (36.7 °C) 76 16 112/67 99 %     Medications received:  NS @ KVO  1 unit PRBC    1st unit PRBCs started and infusing without difficulty, observed x 15 minutes  1st unit completed without adverse reaction noted, NS flushing line. Tolerated transfusion  well, no adverse reaction noted. D/C instructions reviewed, copy to pt, voiced understanding. Patient declined 1 hour post transfusion observation. D/Cd from [facility]  and in no distress. Next appt not needed.

## 2019-11-19 NOTE — TELEPHONE ENCOUNTER
Dr. Milad Chau   Received:  Today   Message Contents   [employer] sent to [facility]         General Message/Vendor Calls     Caller's first and last name: Yoli Holguin from [place] [address]       Reason for call:   Order and consent     Callback required yes/no and why: yes       Best contact number(s): [phone] Urgent       Details to clarify the request: Yoli Holguin from [address] [place] [address] is requesting and order and consent faxed to her at [place] [address]

## 2019-11-19 NOTE — PROGRESS NOTES
OUTPATIENT INFUSION CENTER    DISCHARGE INSTRUCTIONS FOR:  BLOOD TRANSFUSION    We hope you are feeling better after your blood transfusion. Some mild tenderness or slight bruising at your IV site is normal.  Avoid lifting or heavy use of that extremity for the rest of the day. Drink plenty of fluids, eat a normal diet and get some rest.    There are some important signs that you need to watch for in case you experience a delayed reaction to the blood you have received. Call your physician immediately if you develop any of the following symptoms:    1. Severe headache or backache;    2. Fever above 100 degrees;    3. Chills;    4. Difficulty breathing;    5.  Blood or red color in urine;    6. The feeling of weakness or constant fatigue;    7. Yellowing of the whites of your eyes or skin (jaundice). If your physician is not available, call or go to the nearest emergency room, or dial 911.     Monica Arias, Signature: ___________________________ 11/19/2019  Bobby Willis RN

## 2019-11-20 ENCOUNTER — TELEPHONE (OUTPATIENT)
Dept: ONCOLOGY | Age: 44
End: 2019-11-20

## 2019-11-20 DIAGNOSIS — I82.622 ARM DVT (DEEP VENOUS THROMBOEMBOLISM), ACUTE, LEFT (HCC): Primary | ICD-10-CM

## 2019-11-20 LAB
ABO + RH BLD: NORMAL
BLD PROD TYP BPU: NORMAL
BLOOD GROUP ANTIBODIES SERPL: NORMAL
BPU ID: NORMAL
CROSSMATCH RESULT,%XM: NORMAL
SPECIMEN EXP DATE BLD: NORMAL
STATUS OF UNIT,%ST: NORMAL
UNIT DIVISION, %UDIV: 0

## 2019-11-20 NOTE — TELEPHONE ENCOUNTER
Patient states she had a blood transfusion yesterday due to hemaglobin levels being low   She asked to speak with nurse to find out if any blood work or appts needs to be changed or obtained  Thanks    Yong Klein

## 2019-11-21 ENCOUNTER — TELEPHONE (OUTPATIENT)
Dept: INTERNAL MEDICINE CLINIC | Age: 44
End: 2019-11-21

## 2019-11-21 NOTE — TELEPHONE ENCOUNTER
Simpliciano/ telephone   Received: Today   Message Contents   Kim Haney LEONID sent to [facility]  Phone Number: [phone]         Pt is requesting a call back in regards to missed call from practice.  Best contact [phone]

## 2019-11-21 NOTE — TELEPHONE ENCOUNTER
Spoke to patient. Reported she is stable after her Infusion and not as tired. Also had an appointment with her OBGYN who increased her Progesterone medication. Advised patient to call with any concerns.

## 2019-11-22 ENCOUNTER — TELEPHONE (OUTPATIENT)
Dept: ONCOLOGY | Age: 44
End: 2019-11-22

## 2019-11-22 LAB
% SATURATION: 6 % (CALC) (ref 16–45)
ABSOLUTE BANDS, 67058: ABNORMAL
ABSOLUTE BLASTS: ABNORMAL
ABSOLUTE METAMYELOCYTES, 900360: ABNORMAL
ABSOLUTE MYELOCYTES: ABNORMAL
ABSOLUTE NRBC,ANRBC: ABNORMAL
ABSOLUTE PROMYELOCYTES: ABNORMAL
BANDS,BANDS: ABNORMAL
BASOPHILS # BLD: 78 CELLS/UL (ref 0–200)
BASOPHILS NFR BLD: 1 %
BLASTS,BLAST: ABNORMAL
COMMENT(S): ABNORMAL
EOSINOPHIL # BLD: 109 CELLS/UL (ref 15–500)
EOSINOPHIL NFR BLD: 1.4 %
ERYTHROCYTE [DISTWIDTH] IN BLOOD BY AUTOMATED COUNT: 13 % (ref 11–15)
FERRITIN SERPL-MCNC: 12 NG/ML (ref 16–232)
HCT VFR BLD AUTO: 30.2 % (ref 35–45)
HGB BLD-MCNC: 9.9 G/DL (ref 11.7–15.5)
IRON,IRN: 18 MCG/DL (ref 40–190)
LYMPHOCYTES # BLD: 2239 CELLS/UL (ref 850–3900)
LYMPHOCYTES NFR BLD: 28.7 %
MCH RBC QN AUTO: 28.1 PG (ref 27–33)
MCHC RBC AUTO-ENTMCNC: 32.8 G/DL (ref 32–36)
MCV RBC AUTO: 85.8 FL (ref 80–100)
METAMYELOCYTES,METAS: ABNORMAL
MONOCYTES # BLD: 507 CELLS/UL (ref 200–950)
MONOCYTES NFR BLD: 6.5 %
MYELOCYTES,MYELO: ABNORMAL
NEUTROPHILS # BLD AUTO: 4867 CELLS/UL (ref 1500–7800)
NEUTROPHILS # BLD: 62.4 %
NRBC: ABNORMAL
PLATELET # BLD AUTO: 393 THOUSAND/UL (ref 140–400)
PMV BLD AUTO: 9.9 FL (ref 7.5–12.5)
PROMYELOCYTES,PRO: ABNORMAL
RBC # BLD AUTO: 3.52 MILLION/UL (ref 3.8–5.1)
REACTIVE LYMPHS: ABNORMAL
TIBC SERPL-MCNC: 303 MCG/DL (CALC) (ref 250–450)
WBC # BLD AUTO: 7.8 THOUSAND/UL (ref 3.8–10.8)

## 2019-11-22 NOTE — TELEPHONE ENCOUNTER
11/22/19 9:52 AM Advised iron low, start ferrous sulfate BID - add vitamin c to help absorption - take with stool softener if constipation occurs. Will re-check labs before next visit. Continue xalerto but will discuss at OV switching to apixaban BID dosing if menorrhagia continues. Patient verbalized understanding.

## 2019-11-22 NOTE — PROGRESS NOTES
Your hemoglobin dropped from 13 to 8.7. Then increased to 9.9 after a blood transfusion ordered by your PCP. Your iron levels are very low, and I recommend you start taking oral iron supplements twice a day with food along with stool softeners. If you are unable to tolerate the oral iron supplements, we can discuss IV iron at our next visit.

## 2019-11-25 PROBLEM — D50.0 IRON DEFICIENCY ANEMIA DUE TO CHRONIC BLOOD LOSS: Status: ACTIVE | Noted: 2019-11-25

## 2019-11-25 RX ORDER — SODIUM CHLORIDE 9 MG/ML
25 INJECTION, SOLUTION INTRAVENOUS CONTINUOUS
Status: CANCELLED
Start: 2019-12-09

## 2019-11-25 RX ORDER — ALBUTEROL SULFATE 0.83 MG/ML
2.5 SOLUTION RESPIRATORY (INHALATION) AS NEEDED
Status: CANCELLED
Start: 2019-12-02

## 2019-11-25 RX ORDER — HEPARIN 100 UNIT/ML
300-500 SYRINGE INTRAVENOUS AS NEEDED
Status: CANCELLED
Start: 2019-12-02

## 2019-11-25 RX ORDER — DIPHENHYDRAMINE HYDROCHLORIDE 50 MG/ML
50 INJECTION, SOLUTION INTRAMUSCULAR; INTRAVENOUS AS NEEDED
Status: CANCELLED
Start: 2019-12-09

## 2019-11-25 RX ORDER — ONDANSETRON 2 MG/ML
8 INJECTION INTRAMUSCULAR; INTRAVENOUS AS NEEDED
Status: CANCELLED | OUTPATIENT
Start: 2019-12-09

## 2019-11-25 RX ORDER — SODIUM CHLORIDE 9 MG/ML
10 INJECTION INTRAMUSCULAR; INTRAVENOUS; SUBCUTANEOUS AS NEEDED
Status: CANCELLED | OUTPATIENT
Start: 2019-12-02

## 2019-11-25 RX ORDER — HEPARIN 100 UNIT/ML
300-500 SYRINGE INTRAVENOUS AS NEEDED
Status: CANCELLED
Start: 2019-12-09

## 2019-11-25 RX ORDER — SODIUM CHLORIDE 9 MG/ML
25 INJECTION, SOLUTION INTRAVENOUS CONTINUOUS
Status: CANCELLED
Start: 2019-12-02

## 2019-11-25 RX ORDER — ACETAMINOPHEN 325 MG/1
650 TABLET ORAL AS NEEDED
Status: CANCELLED
Start: 2019-12-09

## 2019-11-25 RX ORDER — EPINEPHRINE 1 MG/ML
0.3 INJECTION, SOLUTION, CONCENTRATE INTRAVENOUS AS NEEDED
Status: CANCELLED | OUTPATIENT
Start: 2019-12-09

## 2019-11-25 RX ORDER — HYDROCORTISONE SODIUM SUCCINATE 100 MG/2ML
100 INJECTION, POWDER, FOR SOLUTION INTRAMUSCULAR; INTRAVENOUS AS NEEDED
Status: CANCELLED | OUTPATIENT
Start: 2019-12-02

## 2019-11-25 RX ORDER — ACETAMINOPHEN 325 MG/1
650 TABLET ORAL AS NEEDED
Status: CANCELLED
Start: 2019-12-02

## 2019-11-25 RX ORDER — HYDROCORTISONE SODIUM SUCCINATE 100 MG/2ML
100 INJECTION, POWDER, FOR SOLUTION INTRAMUSCULAR; INTRAVENOUS AS NEEDED
Status: CANCELLED | OUTPATIENT
Start: 2019-12-09

## 2019-11-25 RX ORDER — DIPHENHYDRAMINE HYDROCHLORIDE 50 MG/ML
50 INJECTION, SOLUTION INTRAMUSCULAR; INTRAVENOUS AS NEEDED
Status: CANCELLED
Start: 2019-12-02

## 2019-11-25 RX ORDER — SODIUM CHLORIDE 0.9 % (FLUSH) 0.9 %
10 SYRINGE (ML) INJECTION AS NEEDED
Status: CANCELLED
Start: 2019-12-02

## 2019-11-25 RX ORDER — ONDANSETRON 2 MG/ML
8 INJECTION INTRAMUSCULAR; INTRAVENOUS AS NEEDED
Status: CANCELLED | OUTPATIENT
Start: 2019-12-02

## 2019-11-25 RX ORDER — ALBUTEROL SULFATE 0.83 MG/ML
2.5 SOLUTION RESPIRATORY (INHALATION) AS NEEDED
Status: CANCELLED
Start: 2019-12-09

## 2019-11-25 RX ORDER — SODIUM CHLORIDE 0.9 % (FLUSH) 0.9 %
10 SYRINGE (ML) INJECTION AS NEEDED
Status: CANCELLED
Start: 2019-12-09

## 2019-11-25 RX ORDER — EPINEPHRINE 1 MG/ML
0.3 INJECTION, SOLUTION, CONCENTRATE INTRAVENOUS AS NEEDED
Status: CANCELLED | OUTPATIENT
Start: 2019-12-02

## 2019-11-25 RX ORDER — SODIUM CHLORIDE 9 MG/ML
10 INJECTION INTRAMUSCULAR; INTRAVENOUS; SUBCUTANEOUS AS NEEDED
Status: CANCELLED | OUTPATIENT
Start: 2019-12-09

## 2019-11-26 PROBLEM — Z86.39 HISTORY OF GRAVES' DISEASE: Status: ACTIVE | Noted: 2019-11-26

## 2019-11-26 PROBLEM — M19.90 ARTHRITIS: Status: ACTIVE | Noted: 2019-11-26

## 2019-12-02 ENCOUNTER — HOSPITAL ENCOUNTER (OUTPATIENT)
Dept: INFUSION THERAPY | Age: 44
Discharge: HOME | End: 2019-12-02
Payer: COMMERCIAL

## 2019-12-02 VITALS
HEART RATE: 61 BPM | OXYGEN SATURATION: 97 % | SYSTOLIC BLOOD PRESSURE: 102 MMHG | RESPIRATION RATE: 18 BRPM | WEIGHT: 158.7 LBS | TEMPERATURE: 98.3 F | DIASTOLIC BLOOD PRESSURE: 56 MMHG | BODY MASS INDEX: 29.03 KG/M2

## 2019-12-02 DIAGNOSIS — D50.0 IRON DEFICIENCY ANEMIA DUE TO CHRONIC BLOOD LOSS: Primary | ICD-10-CM

## 2019-12-02 PROCEDURE — 74011250636 HC RX REV CODE- 250/636: Performed by: NURSE PRACTITIONER

## 2019-12-02 PROCEDURE — 96365 THER/PROPH/DIAG IV INF INIT: CPT

## 2019-12-02 RX ORDER — SODIUM CHLORIDE 0.9 % (FLUSH) 0.9 %
10 SYRINGE (ML) INJECTION AS NEEDED
Status: ACTIVE | OUTPATIENT
Start: 2019-12-02 | End: 2019-12-02

## 2019-12-02 RX ORDER — NORETHINDRONE 5 MG/1
2.5 TABLET ORAL 3 TIMES DAILY
COMMUNITY
End: 2021-03-04

## 2019-12-02 RX ORDER — HEPARIN 100 UNIT/ML
300-500 SYRINGE INTRAVENOUS AS NEEDED
Status: ACTIVE | OUTPATIENT
Start: 2019-12-02 | End: 2019-12-02

## 2019-12-02 RX ORDER — SODIUM CHLORIDE 9 MG/ML
10 INJECTION INTRAMUSCULAR; INTRAVENOUS; SUBCUTANEOUS AS NEEDED
Status: ACTIVE | OUTPATIENT
Start: 2019-12-02 | End: 2019-12-02

## 2019-12-02 RX ORDER — SODIUM CHLORIDE 9 MG/ML
25 INJECTION, SOLUTION INTRAVENOUS CONTINUOUS
Status: DISCONTINUED | OUTPATIENT
Start: 2019-12-02 | End: 2019-12-03 | Stop reason: HOSPADM

## 2019-12-02 RX ADMIN — FERRIC CARBOXYMALTOSE INJECTION 750 MG: 50 INJECTION, SOLUTION INTRAVENOUS at 12:09

## 2019-12-02 RX ADMIN — Medication 10 ML: at 11:45

## 2019-12-02 RX ADMIN — SODIUM CHLORIDE 25 ML/HR: 900 INJECTION, SOLUTION INTRAVENOUS at 12:09

## 2019-12-02 NOTE — PROGRESS NOTES
[facility] Progress Note    Date: 2019    Name: Gareth Jamil    MRN: [id number]         : 1975    1135:  Ms. Gigi Bansal Arrived ambulatory and in no distress for Dose 1 of 2  for Injectafer. Assessment was completed, no acute issues at this time, no new complaints voiced. 24 G PIV established to right arm without difficulty. Ms. Garcia Mcadams vitals were reviewed. Patient Vitals for the past 24 hrs:   Temp Pulse Resp BP SpO2   19 1241 -- 61 -- 102/56 --   19 1136 98.3 °F (36.8 °C) 65 18 114/63 97 %         Medications:  NS Mahala Gilding IV      Ms. Gigi Bansal tolerated treatment well and was discharged from [place] [address] in stable condition . PIV flushed & removed. Discharge instructions reviewed with patient, verbalized understanding. Patient did agree to stay 30 minutes post infusion for monitoring. She is to return on  at 1130 for her next appointment.     Ghada Baptiste RN  2019

## 2019-12-05 DIAGNOSIS — N92.0 MENORRHAGIA WITH REGULAR CYCLE: ICD-10-CM

## 2019-12-09 ENCOUNTER — HOSPITAL ENCOUNTER (OUTPATIENT)
Dept: INFUSION THERAPY | Age: 44
Discharge: HOME | End: 2019-12-09
Payer: COMMERCIAL

## 2019-12-09 VITALS
BODY MASS INDEX: 29.25 KG/M2 | SYSTOLIC BLOOD PRESSURE: 104 MMHG | OXYGEN SATURATION: 98 % | TEMPERATURE: 98.8 F | WEIGHT: 159.9 LBS | HEART RATE: 61 BPM | DIASTOLIC BLOOD PRESSURE: 63 MMHG | RESPIRATION RATE: 16 BRPM

## 2019-12-09 DIAGNOSIS — D50.0 IRON DEFICIENCY ANEMIA DUE TO CHRONIC BLOOD LOSS: Primary | ICD-10-CM

## 2019-12-09 PROCEDURE — 96365 THER/PROPH/DIAG IV INF INIT: CPT

## 2019-12-09 PROCEDURE — 74011250636 HC RX REV CODE- 250/636: Performed by: NURSE PRACTITIONER

## 2019-12-09 RX ORDER — SODIUM CHLORIDE 9 MG/ML
25 INJECTION, SOLUTION INTRAVENOUS CONTINUOUS
Status: DISPENSED | OUTPATIENT
Start: 2019-12-09 | End: 2019-12-09

## 2019-12-09 RX ORDER — HEPARIN 100 UNIT/ML
300-500 SYRINGE INTRAVENOUS AS NEEDED
Status: ACTIVE | OUTPATIENT
Start: 2019-12-09 | End: 2019-12-09

## 2019-12-09 RX ORDER — SODIUM CHLORIDE 0.9 % (FLUSH) 0.9 %
10 SYRINGE (ML) INJECTION AS NEEDED
Status: ACTIVE | OUTPATIENT
Start: 2019-12-09 | End: 2019-12-09

## 2019-12-09 RX ORDER — SODIUM CHLORIDE 9 MG/ML
10 INJECTION INTRAMUSCULAR; INTRAVENOUS; SUBCUTANEOUS AS NEEDED
Status: ACTIVE | OUTPATIENT
Start: 2019-12-09 | End: 2019-12-09

## 2019-12-09 RX ADMIN — SODIUM CHLORIDE 25 ML/HR: 900 INJECTION, SOLUTION INTRAVENOUS at 12:11

## 2019-12-09 RX ADMIN — FERRIC CARBOXYMALTOSE INJECTION 750 MG: 50 INJECTION, SOLUTION INTRAVENOUS at 12:11

## 2019-12-09 NOTE — PROGRESS NOTES
[facility] Progress Note    Date: 2019    Name: Melissa Paige    MRN: [id number]         : 1975    1135:  Ms. Amara Villanueva Arrived ambulatory and in no distress for Dose 2 of 2  for Injectafer. Assessment was completed, no acute issues at this time, no new complaints voiced. 24 G PIV established to right arm without difficulty. Ms. Rishi Nath vitals were reviewed. Patient Vitals for the past 24 hrs:   Temp Pulse Resp BP SpO2   19 1241 98.8 °F (37.1 °C) 61 16 104/63 --   19 1144 98.4 °F (36.9 °C) 62 20 101/62 98 %           Medications:  NS Kavita Winston IV      Ms. Amara Villanueva tolerated treatment well and was discharged from [place] [address] in stable condition . PIV flushed & removed. Discharge instructions reviewed with patient, verbalized understanding. Patient politely declined to stay 30 minutes post infusion for monitoring.         Eleonora Sandy RN  2019

## 2019-12-14 LAB
% SATURATION: 39 % (CALC) (ref 16–45)
ABSOLUTE BANDS, 67058: ABNORMAL
ABSOLUTE BLASTS: ABNORMAL
ABSOLUTE METAMYELOCYTES, 900360: ABNORMAL
ABSOLUTE MYELOCYTES: ABNORMAL
ABSOLUTE NRBC,ANRBC: ABNORMAL
ABSOLUTE PROMYELOCYTES: ABNORMAL
BANDS,BANDS: ABNORMAL
BASOPHILS # BLD: 80 CELLS/UL (ref 0–200)
BASOPHILS NFR BLD: 1 %
BLASTS,BLAST: ABNORMAL
COMMENT(S): ABNORMAL
EOSINOPHIL # BLD: 128 CELLS/UL (ref 15–500)
EOSINOPHIL NFR BLD: 1.6 %
ERYTHROCYTE [DISTWIDTH] IN BLOOD BY AUTOMATED COUNT: 15.8 % (ref 11–15)
FERRITIN SERPL-MCNC: 831 NG/ML (ref 16–232)
HCT VFR BLD AUTO: 38.8 % (ref 35–45)
HGB BLD-MCNC: 12.3 G/DL (ref 11.7–15.5)
IRON,IRN: 107 MCG/DL (ref 40–190)
LYMPHOCYTES # BLD: 1880 CELLS/UL (ref 850–3900)
LYMPHOCYTES NFR BLD: 23.5 %
MCH RBC QN AUTO: 27.6 PG (ref 27–33)
MCHC RBC AUTO-ENTMCNC: 31.7 G/DL (ref 32–36)
MCV RBC AUTO: 87 FL (ref 80–100)
METAMYELOCYTES,METAS: ABNORMAL
MONOCYTES # BLD: 504 CELLS/UL (ref 200–950)
MONOCYTES NFR BLD: 6.3 %
MYELOCYTES,MYELO: ABNORMAL
NEUTROPHILS # BLD AUTO: 5408 CELLS/UL (ref 1500–7800)
NEUTROPHILS # BLD: 67.6 %
NRBC: ABNORMAL
PLATELET # BLD AUTO: 411 THOUSAND/UL (ref 140–400)
PMV BLD AUTO: 10.8 FL (ref 7.5–12.5)
PROMYELOCYTES,PRO: ABNORMAL
RBC # BLD AUTO: 4.46 MILLION/UL (ref 3.8–5.1)
REACTIVE LYMPHS: ABNORMAL
TIBC SERPL-MCNC: 274 MCG/DL (CALC) (ref 250–450)
WBC # BLD AUTO: 8 THOUSAND/UL (ref 3.8–10.8)

## 2019-12-17 ENCOUNTER — OFFICE VISIT (OUTPATIENT)
Dept: ONCOLOGY | Age: 44
End: 2019-12-17

## 2019-12-17 VITALS
TEMPERATURE: 98 F | WEIGHT: 160 LBS | OXYGEN SATURATION: 99 % | RESPIRATION RATE: 16 BRPM | HEART RATE: 58 BPM | HEIGHT: 62 IN | SYSTOLIC BLOOD PRESSURE: 115 MMHG | DIASTOLIC BLOOD PRESSURE: 77 MMHG | BODY MASS INDEX: 29.44 KG/M2

## 2019-12-17 DIAGNOSIS — Z86.39 HISTORY OF IRON DEFICIENCY: Primary | ICD-10-CM

## 2019-12-17 DIAGNOSIS — I82.A12 ACUTE DEEP VEIN THROMBOSIS (DVT) OF AXILLARY VEIN OF LEFT UPPER EXTREMITY (HCC): ICD-10-CM

## 2019-12-17 DIAGNOSIS — N92.0 MENORRHAGIA WITH REGULAR CYCLE: ICD-10-CM

## 2019-12-17 RX ORDER — DOCUSATE SODIUM 100 MG/1
100 CAPSULE, LIQUID FILLED ORAL DAILY
COMMUNITY
End: 2021-03-31

## 2019-12-17 NOTE — PROGRESS NOTES
Ramesh Pereira is a 38 y.o. female here today for follow up of DVT. Would like to know if ok to take CBD extract? States recent episodes of vomiting when taking iron tabs.   She is asking when is it ok to use left arm for blood draws, blood pressures, etc.

## 2019-12-17 NOTE — PROGRESS NOTES
[id number] [facility] Oncology at [facility]  [phone]    Hematology / Oncology Established Visit    Reason for Visit:   Evangelist Leos is a 38 y.o. female who comes in for f/u of DVT and anemia. Initially referred by Dr. Carl Marroquin. History of Present Illness:   Monica Brown is a 38 y.o. female with h/o DVT comes in for management of acute DVT. Patient presented to [facility] ED on 10/30/19 with left arm pain, swelling and coldness (3 day h/o). Patient did have a prior DVT in E while 7 mo pregnant with her daughter 15 yrs ago. At that time, she was treated with Lovenox throughout most of the pregnancy, then transitioned to Heparin injections the last 3 weeks of pregnancy. After delivery, she was placed on Warfarin for 6 months. Given her prior h/o DVT during pregnancy approx 15 yrs ago, patient underwent ultrasound doppler exam, which was notable for left subclavian and axillary vein DVT. No recent surgeries, arm trauma, OCPs, 8 hr long trips. No recent infections. She had a corticosteroid injection in her C-spine region midline per patient. She does exercise regularly since June 2019. She was started on Xarelto. She also has a h/o tubal ligation performed 14 yrs ago. Prior to starting Xarelto, she had a few days of spotting. However, after starting Xarelto, she developed persistent heavy vaginal bleeding requiring 1 pad every hour. She was started on Megace by Gyn on 11/4/19. Her menstrual bleeding has decreased slightly, but still having clotting and bleeding, now requiring pad change every 2 hours. Mother had vascular problems including atherosclerosis, but did not have blood clots. Interval History: At the last visit, patient was found to have drop in Hgb to 8 range due to menorrhagia. She started on bid iron supplements in mid Nov 2019. She did have n/v, abdominal cramping with the iron supplements. She has cut back to once daily. She then received Injectafer in early 12/2019. She has multiple complaints which she asks whether they are related to Xarelto: ear congestion, numbness/tingling in her face and hands, fluttering/tremor in her chest.    Past Medical History:   Diagnosis Date    Arthritis     Asthma     Chronic pain     DVT (deep venous thrombosis) (HCC)       Past Surgical History:   Procedure Laterality Date    HX GYN      tubal ligation    HX HEENT      Multiple nasal polyp removal      Social History     Tobacco Use    Smoking status: Never Smoker    Smokeless tobacco: Never Used   Substance Use Topics    Alcohol use: Yes     Alcohol/week: 1.0 standard drinks     Types: 1 Standard drinks or equivalent per week     Comment: occ      Family History   Problem Relation Age of Onset    Heart Disease Mother 77    Hypertension Mother     Hypertension Father      Current Outpatient Medications   Medication Sig    docusate sodium (COLACE) 100 mg capsule Take 100 mg by mouth daily.  norethindrone acetate (AYGESTIN) 5 mg tablet Take 5 mg by mouth daily.  rivaroxaban (XARELTO) 20 mg tab tablet Take 1 Tab by mouth daily (with breakfast).  ferrous sulfate (IRON) 325 mg (65 mg iron) EC tablet Take 1 Tab by mouth every other day for 90 days.  cholecalciferol (VITAMIN D3) 5,000 unit capsule cholecalciferol (vitamin D3) 5,000 unit capsule    CANNABIDIOL, CBD, EXTRACT PO Take  by mouth two (2) times a day. No current facility-administered medications for this visit. Allergies   Allergen Reactions    Aspirin Shortness of Breath    Nsaids (Non-Steroidal Anti-Inflammatory Drug) Shortness of Breath        Review of Systems: A complete review of systems was obtained, negative except as described above.     Physical Exam:     Visit Vitals  /77   Pulse (!) 58   Temp 98 °F (36.7 °C) (Oral)   Resp 16   Ht 5' 2\" (1.575 m)   Wt 160 lb (72.6 kg)   SpO2 99%   BMI 29.26 kg/m²     ECOG PS: 0  General: Well developed, no acute distress  Eyes: PERRLA, EOMI, anicteric sclerae  HENT: Atraumatic, OP clear, TMs intact without erythema  Neck: Supple  Lymphatic: No cervical, supraclavicular, axillary or inguinal adenopathy  Respiratory: CTAB, normal respiratory effort  CV: Normal rate, regular rhythm, no murmurs. Left arm prior swelling improved. GI: Soft, nontender, nondistended, no masses, no hepatomegaly, no splenomegaly  MS: Normal gait and station. Digits without clubbing or cyanosis. Skin: No rashes, ecchymoses, or petechiae. Normal temperature, turgor, and texture. Neuro/Psych: Alert, oriented. 5/5 strength in all 4 extremities. Appropriate affect, normal judgment/insight. Results:     Lab Results   Component Value Date/Time    WBC 8.0 12/13/2019 11:02 AM    HGB 12.3 12/13/2019 11:02 AM    HCT 38.8 12/13/2019 11:02 AM    PLATELET 881 (H) 50/71/2793 11:02 AM    MCV 87.0 12/13/2019 11:02 AM    ABS. NEUTROPHILS 5,408 12/13/2019 11:02 AM     Lab Results   Component Value Date/Time    Sodium 142 11/02/2019 12:49 PM    Potassium 3.5 11/02/2019 12:49 PM    Chloride 110 (H) 11/02/2019 12:49 PM    CO2 24 11/02/2019 12:49 PM    Glucose 94 11/02/2019 12:49 PM    BUN 10 11/02/2019 12:49 PM    Creatinine 0.87 11/02/2019 12:49 PM    GFR est AA >60 11/02/2019 12:49 PM    GFR est non-AA >60 11/02/2019 12:49 PM    Calcium 9.1 11/02/2019 12:49 PM     Lab Results   Component Value Date/Time    Bilirubin, total 0.2 11/02/2019 12:49 PM    ALT (SGPT) 14 11/02/2019 12:49 PM    AST (SGOT) 11 (L) 11/02/2019 12:49 PM    Alk.  phosphatase 67 11/02/2019 12:49 PM    Protein, total 7.4 11/02/2019 12:49 PM    Albumin 3.6 11/02/2019 12:49 PM    Globulin 3.8 11/02/2019 12:49 PM     Lab Results   Component Value Date/Time    Iron 107 12/13/2019 11:02 AM    Iron binding capacity 274 12/13/2019 11:02 AM    Ferritin 831 (H) 12/13/2019 11:02 AM       No results found for: B12LT, FOL, RBCF  Lab Results   Component Value Date/Time    TSH 1.13 11/14/2019 12:00 AM     No results found for: HAMAT, HAAB, HABT, HAAT, HBSAG, HBSB, HBSAT, HBABN, HBCM, [address], HBCAT, Maia Dotson, [address] [facility], [address], [address] [facility], [id number], [address] [facility], [facility], [address] [facility], [address] [place] [address], [id number], [id number], [address] [facility], [id number], HBCMLT, [id number], HCGAT      Imaging:     10/30/19 upper extremity doppler: Left upper extremity venous duplex is positive for acute occlusive deep venous thrombosis involving subclavian and axillary veins. Right common femoral vein is thrombus free. Assessment & Plan:   Monica Hair is a 38 y.o. female comes in with DVT. 1. LUE DVT: Acute DVT involving left subclavian and axillary veins. This is an unprovoked event and her 2nd lifetime thrombotic event, which will require lifelong anticoagulation. I discussed risks/benefits of continuing on anticoagulation indefinitely. I also discussed that given her menorrhagia, we could try switching to Eliquis which has a slightly lower risk of bleeding. She has already completed 1 week of the Xarelto. Once she gets down to once daily dosing, she may find her menorrhagia improves. -- Continue anticoagulation indefinitely  -- Given worsening menorrhagia on Xarelto, will try switching to Eliquis 5mg bid. -- Return in 3 months for f/u    2. H/o LLE DVT: Attributed to pregnancy and completed 6 months of anticoagulation at that time. No family h/o thrombosis. 3. Menorrhagia: Exacerbated by Xarelto, but labs on 11/2/19 show normal CBC without anemia. The recent vaginal bleeding was thought to be 2/2 menstrual cycle with some worsening due to Xarelto. Menstrual periods have lasted anywhere from 14-23 days. Is taking a Norethindrone (Progesterone like med) for this. Is discussing hysterectomy with Gyn as well.   -- f/u with Gyn.     4. Iron deficiency anemia: Now improved s/p Injectafer in 12/2019.  -- Recheck CBC and iron profile in 3 months. I appreciate the opportunity to participate in Ms. Monica Arias's care.     Signed By: Shelia Ye MD     December 17, 2019

## 2019-12-30 ENCOUNTER — OFFICE VISIT (OUTPATIENT)
Dept: NEUROLOGY | Age: 44
End: 2019-12-30

## 2019-12-30 VITALS
DIASTOLIC BLOOD PRESSURE: 64 MMHG | OXYGEN SATURATION: 98 % | RESPIRATION RATE: 16 BRPM | WEIGHT: 161 LBS | HEART RATE: 64 BPM | HEIGHT: 62 IN | BODY MASS INDEX: 29.63 KG/M2 | SYSTOLIC BLOOD PRESSURE: 112 MMHG

## 2019-12-30 DIAGNOSIS — R20.2 TINGLING IN EXTREMITIES: ICD-10-CM

## 2019-12-30 RX ORDER — LANOLIN ALCOHOL/MO/W.PET/CERES
1000 CREAM (GRAM) TOPICAL DAILY
COMMUNITY
End: 2021-02-23

## 2019-12-30 NOTE — LETTER
12/30/19 Patient: Esdras Richardson YOB: 1975 Date of Visit: 12/30/2019 Claudeen Herd, MD 
[address] [facility] [address] [facility] [id number] VIA In Basket Dear Claudeen Herd, MD, Thank you for referring Ms. Charla Medrano to [facility] for evaluation. My notes for this consultation are attached. If you have questions, please do not hesitate to call me. I look forward to following your patient along with you.  
 
 
Sincerely, 
 
Maryam Mcarthur MD

## 2019-12-30 NOTE — PROGRESS NOTES
Neurology Consult      Subjective:      Monica Kim is a 38 y.o. female comes in today who with the following history. Says for 2 years or so has noticed this tingling sensation in the palms and soles that is nonpainful and continuous. At one point was not continuous but she does not recall the transition timeframe. 2 months ago ended up with a blood clot in her left arm and at the same time numbness in the face? Rock Carl Also some feeling of tingling in the back of the head for 2 months and spontaneously offered she has known degenerative joint disc disease in her neck. No background history of diabetes and hemoglobin A1c recently normal.  Bowel and bladder function okay. Bulbar function mostly normal except occasional random self-limited swallowing issues? Better in recent times. Special sensory function intact but seems to be sensitive to stimuli? Family history negative for the same. Cognition is okay. Does not smoke alcohol ingestion is okay and is never been a problems. Uses CBD oil but no street drugs. Current Outpatient Medications   Medication Sig Dispense Refill    MAGNESIUM PO Take 300 mg by mouth.  turmeric (CURCUMIN) by Does Not Apply route.  cyanocobalamin 1,000 mcg tablet Take 1,000 mcg by mouth daily.  5-hydroxytryptophan, 5-HTP, (5-HTP PO) Take  by mouth.  SLIPPERY ELM BARK PO Take  by mouth.  apixaban (ELIQUIS) 5 mg tablet Take 1 Tab by mouth two (2) times a day. 60 Tab 3    norethindrone acetate (AYGESTIN) 5 mg tablet Take 5 mg by mouth two (2) times a day.  cholecalciferol (VITAMIN D3) 5,000 unit capsule cholecalciferol (vitamin D3) 5,000 unit capsule      CANNABIDIOL, CBD, EXTRACT PO Take  by mouth two (2) times a day.  docusate sodium (COLACE) 100 mg capsule Take 100 mg by mouth daily.  ferrous sulfate (IRON) 325 mg (65 mg iron) EC tablet Take 1 Tab by mouth every other day for 90 days.  90 Tab 1      Allergies   Allergen Reactions    Aspirin Shortness of Breath    Nsaids (Non-Steroidal Anti-Inflammatory Drug) Shortness of Breath     Past Medical History:   Diagnosis Date    Anxiety disorder     Arthritis     Asthma     Chronic pain     DVT [facility]deep venous thrombosis) ([facility]) 10/2019    Dysthymic     Fatigue     Graves disease     Headache     Joint pain     Migraines     Neuropathy     SOB (shortness of breath)     Tinnitus       Past Surgical History:   Procedure Laterality Date    HX GYN      tubal ligation    HX HEENT      Multiple nasal polyp removal    HX WISDOM TEETH EXTRACTION        Social History     Socioeconomic History    Marital status:      Spouse name: Not on file    Number of children: Not on file    Years of education: Not on file    Highest education level: Not on file   Occupational History    Not on file   Social Needs    Financial resource strain: Not on file    Food insecurity:     Worry: Not on file     Inability: Not on file    Transportation needs:     Medical: Not on file     Non-medical: Not on file   Tobacco Use    Smoking status: Never Smoker    Smokeless tobacco: Never Used   Substance and Sexual Activity    Alcohol use:  Yes     Alcohol/week: 3.0 standard drinks     Types: 1 Cans of beer, 1 Shots of liquor, 1 Standard drinks or equivalent per week    Drug use: Never    Sexual activity: Yes     Partners: Male   Lifestyle    Physical activity:     Days per week: Not on file     Minutes per session: Not on file    Stress: Not on file   Relationships    Social connections:     Talks on phone: Not on file     Gets together: Not on file     Attends Taoist service: Not on file     Active member of club or organization: Not on file     Attends meetings of clubs or organizations: Not on file     Relationship status: Not on file    Intimate partner violence:     Fear of current or ex partner: Not on file     Emotionally abused: Not on file     Physically abused: Not on file     Forced sexual activity: Not on file   Other Topics Concern    Not on file   Social History Narrative    Not on file      Family History   Problem Relation Age of Onset    Heart Disease Mother 77    Hypertension Mother     Hypertension Father     Heart Disease Father     Stroke Father       Visit Vitals  /64   Pulse 64   Resp 16   Ht 5' 2\" (1.575 m)   Wt 73 kg (161 lb)   LMP 12/05/2019 (Exact Date) Comment: Continuous bleeding with anticoag. SpO2 98%   BMI 29.45 kg/m²        Review of Systems:   A comprehensive review of systems was negative except for that written in the HPI. Neuro Exam:     Appearance: The patient is well developed, well nourished, provides a coherent history and is in no acute distress. Mental Status: Oriented to time, place and person. Mood and affect appropriate. Cranial Nerves:   Intact visual fields. Fundi are benign. ALAN, EOM's full, no nystagmus, no ptosis. Facial sensation is normal. Corneal reflexes are intact. Facial movement is symmetric. Hearing is normal bilaterally. Palate is midline with normal sternocleidomastoid and trapezius muscles are normal. Tongue is midline. Motor:  5/5 strength in upper and lower proximal and distal muscles. Normal bulk and tone. No fasciculations. Reflexes:   Deep tendon reflexes 2-3+/4 and symmetrical.   Sensory:   Normal to touch, pinprick and vibration. Position sense intact. Gait:  Normal gait. Including on toes on heels independent leg standing tandem and Romberg    Tremor:   No tremor noted. Cerebellar:  No cerebellar signs present. Neurovascular:  Normal heart sounds and regular rhythm, peripheral pulses intact, and no carotid bruits. Toes downgoing no clonus no Cisneros's and Lhermitte's negative            Assessment:   Global paresthesias. We will approach this as a neuropathy work-up with labs and EMG and nerve conduction symptomatic arm and leg. Other suggestions may follow.   Exam on first encounter looks normal.      Plan:   Revisit in about 6 weeks.   Signed by :  Carolynn Marcial MD

## 2019-12-30 NOTE — PATIENT INSTRUCTIONS
RESULT POLICY      If we have ordered testing for you, know that; \"NO NEWS IS GOOD NEWS! \"     It is our policy that we no longer call patients with results, nor do we  give test results over the phone. We schedule follow up appointments so that your results can be discussed in person. This allows you to address any questions you have regarding the results. If you choose to go to an imaging center outside of [facility], it is your responsibility to bring imaging report and disc to follow up appointment. If something of concern is revealed on your test, we will contact you to discuss the matter and if needed schedule a sooner follow up appointment. Additionally, results may be found by using the My Chart feature and one of our patient service representatives at the  can give you instructions on how to access this feature to utilize our electronic medical record system. Thank you for your understanding. Learning About Keith Corea  What is a living will? A living will is a legal form you use to write down the kind of care you want at the end of your life. It is used by the health professionals who will treat you if you aren't able to decide for yourself. If you put your wishes in writing, your loved ones and others will know what kind of care you want. They won't need to guess. This can ease your mind and be helpful to others. A living will is not the same as an estate or property will. An estate will explains what you want to happen with your money and property after you die. Is a living will a legal document? A living will is a legal document. Each state has its own laws about living brooks. If you move to another state, make sure that your living will is legal in the state where you now live. Or you might use a universal form that has been approved by many states. This kind of form can sometimes be completed and stored online.  Your electronic copy will then be available wherever you have a connection to the Internet. In most cases, doctors will respect your wishes even if you have a form from a different state. · You don't need an  to complete a living will. But legal advice can be helpful if your state's laws are unclear, your health history is complicated, or your family can't agree on what should be in your living will. · You can change your living will at any time. Some people find that their wishes about end-of-life care change as their health changes. · In addition to making a living will, think about completing a medical power of  form. This form lets you name the person you want to make end-of-life treatment decisions for you (your \"health care agent\") if you're not able to. Many hospitals and nursing homes will give you the forms you need to complete a living will and a medical power of . · Your living will is used only if you can't make or communicate decisions for yourself anymore. If you become able to make decisions again, you can accept or refuse any treatment, no matter what you wrote in your living will. · Your state may offer an online registry. This is a place where you can store your living will online so the doctors and nurses who need to treat you can find it right away. What should you think about when creating a living will? Talk about your end-of-life wishes with your family members and your doctor. Let them know what you want. That way the people making decisions for you won't be surprised by your choices. Think about these questions as you make your living will:  · Do you know enough about life support methods that might be used? If not, talk to your doctor so you know what might be done if you can't breathe on your own, your heart stops, or you're unable to swallow. · What things would you still want to be able to do after you receive life-support methods? Would you want to be able to walk? To speak?  To eat on your own? To live without the help of machines? · If you have a choice, where do you want to be cared for? In your home? At a hospital or nursing home? · Do you want certain Zoroastrianism practices performed if you become very ill? · If you have a choice at the end of your life, where would you prefer to die? At home? In a hospital or nursing home? Somewhere else? · Would you prefer to be buried or cremated? · Do you want your organs to be donated after you die? What should you do with your living will? · Make sure that your family members and your health care agent have copies of your living will. · Give your doctor a copy of your living will to keep in your medical record. If you have more than one doctor, make sure that each one has a copy. · You may want to put a copy of your living will where it can be easily found. Where can you learn more? Go to http://rosemarie-radha.info/. Enter Q852 in the search box to learn more about \"Learning About Living Perroy. \"  Current as of: April 1, 2019  Content Version: 12.2  © 8431-3644 [employer]. Care instructions adapted under license by [facility] (which disclaims liability or warranty for this information). If you have questions about a medical condition or this instruction, always ask your healthcare professional. [address] any warranty or liability for your use of this information. Patient history reviewed patient examined. Will start off with lab work as fasting and set up an EMG and nerve conduction of a symptomatic arm and leg. Further suggestions may follow.

## 2020-01-03 LAB
ABNORMAL PROTEIN BAND 1,335546: NORMAL
ABNORMAL PROTEIN BAND 2,335547: NORMAL
ABNORMAL PROTEIN BAND 3,335548: NORMAL
ABSOLUTE BANDS, 67058: ABNORMAL
ABSOLUTE BLASTS: ABNORMAL
ABSOLUTE METAMYELOCYTES, 900360: ABNORMAL
ABSOLUTE MYELOCYTES: ABNORMAL
ABSOLUTE NRBC,ANRBC: ABNORMAL
ABSOLUTE PROMYELOCYTES: ABNORMAL
ALB/GLOBRATIO, 58C: 1.7 (CALC) (ref 1–2.5)
ALBUMIN SERPL-MCNC: 4 G/DL (ref 3.8–4.8)
ALBUMIN SERPL-MCNC: 4.1 G/DL (ref 3.6–5.1)
ALP SERPL-CCNC: 56 U/L (ref 33–115)
ALPHA-1-GLOBULIN, 001489: 0.3 G/DL (ref 0.2–0.3)
ALPHA-2-GLOBULIN, 001490: 0.7 G/DL (ref 0.5–0.9)
ALT SERPL-CCNC: 33 U/L (ref 6–29)
ANA SCREEN, IFA: NEGATIVE
AST SERPL W P-5'-P-CCNC: 19 U/L (ref 10–35)
BANDS,BANDS: ABNORMAL
BASOPHILS # BLD: 101 CELLS/UL (ref 0–200)
BASOPHILS NFR BLD: 1.4 %
BETA 1 GLOBULIN: 0.4 G/DL (ref 0.4–0.6)
BETA 2 GLOBULIN: 0.4 G/DL (ref 0.2–0.5)
BILIRUB SERPL-MCNC: 0.3 MG/DL (ref 0.2–1.2)
BLASTS,BLAST: ABNORMAL
BUN SERPL-MCNC: 8 MG/DL (ref 7–25)
BUN/CREATININE RATIO,BUCR: ABNORMAL (CALC) (ref 6–22)
CALCIUM SERPL-MCNC: 8.8 MG/DL (ref 8.6–10.2)
CARDIOLIPIN AB IGG,CLPN1: <14 GPL
CARDIOLIPIN, IGA,CLPN3: <11 APL
CHLORIDE SERPL-SCNC: 112 MMOL/L (ref 98–110)
CO2 SERPL-SCNC: 19 MMOL/L (ref 20–32)
COMMENT(S): ABNORMAL
CREAT SERPL-MCNC: 0.84 MG/DL (ref 0.5–1.1)
CRP SERPL QL: 6.6 MG/L
ENA SS-A AB SER-ACNC: NORMAL AI
ENA SS-B AB SER-ACNC: NORMAL AI
EOSINOPHIL # BLD: 158 CELLS/UL (ref 15–500)
EOSINOPHIL NFR BLD: 2.2 %
ERYTHROCYTE [DISTWIDTH] IN BLOOD BY AUTOMATED COUNT: 17.6 % (ref 11–15)
ESR, WESTERGREN: 2 MM/H (ref 0–20)
FOLATE,FOL: 19.3 NG/ML
GAMMA GLOBULIN, 001492: 1 G/DL (ref 0.8–1.7)
GLOBULIN,GLOB: 2.4 G/DL (CALC) (ref 1.9–3.7)
GLUCOSE SERPL-MCNC: 82 MG/DL (ref 65–99)
HCT VFR BLD AUTO: 41.9 % (ref 35–45)
HGB BLD-MCNC: 13.9 G/DL (ref 11.7–15.5)
INTERPRETATION: NORMAL
LYMPHOCYTES # BLD: 2405 CELLS/UL (ref 850–3900)
LYMPHOCYTES NFR BLD: 33.4 %
Lab: <12 MPL
MCH RBC QN AUTO: 28.8 PG (ref 27–33)
MCHC RBC AUTO-ENTMCNC: 33.2 G/DL (ref 32–36)
MCV RBC AUTO: 86.7 FL (ref 80–100)
METAMYELOCYTES,METAS: ABNORMAL
MONOCYTES # BLD: 482 CELLS/UL (ref 200–950)
MONOCYTES NFR BLD: 6.7 %
MYELOCYTES,MYELO: ABNORMAL
NEUTROPHILS # BLD AUTO: 4054 CELLS/UL (ref 1500–7800)
NEUTROPHILS # BLD: 56.3 %
NRBC: ABNORMAL
PLATELET # BLD AUTO: 293 THOUSAND/UL (ref 140–400)
PMV BLD AUTO: 10.7 FL (ref 7.5–12.5)
POTASSIUM SERPL-SCNC: 3.8 MMOL/L (ref 3.5–5.3)
PROMYELOCYTES,PRO: ABNORMAL
PROT SERPL-MCNC: 6.5 G/DL (ref 6.1–8.1)
PROT SERPL-MCNC: 6.8 G/DL (ref 6.1–8.1)
RBC # BLD AUTO: 4.83 MILLION/UL (ref 3.8–5.1)
REACTIVE LYMPHS: ABNORMAL
RHEUMATOID FACT SERPL-ACNC: <14 IU/ML (ref 0–15)
SODIUM SERPL-SCNC: 141 MMOL/L (ref 135–146)
VIT B12 SERPL-MCNC: 778 PG/ML (ref 200–1100)
WBC # BLD AUTO: 7.2 THOUSAND/UL (ref 3.8–10.8)

## 2020-01-06 DIAGNOSIS — R20.2 TINGLING IN EXTREMITIES: ICD-10-CM

## 2020-01-07 ENCOUNTER — OFFICE VISIT (OUTPATIENT)
Dept: NEUROLOGY | Age: 45
End: 2020-01-07

## 2020-01-07 DIAGNOSIS — G62.9 ACQUIRED POLYNEUROPATHY: Primary | ICD-10-CM

## 2020-01-07 NOTE — PROGRESS NOTES
EMG/ NCS Report  [facility], [address]  Ph: [phone]/ [phone]  FAX: [phone]  Test Date:  2020    Patient: Jay Head : 1975 Physician: Henry Chu MD   Sex: Female Height: ' \" Ref Phys: Zane Brown IV, MD   ID#: [id number] Weight:  lbs. Technician: Fidel Rider     Patient History / Exam:  CC:PARESTHESIA          EMG & NCV Findings:  Evaluation of the Right Fibular motor nerve showed normal distal onset latency (5.1 ms), normal amplitude (3.9 mV), normal conduction velocity (B Fib-Ankle, 52 m/s), and normal conduction velocity (Poplt-B Fib, 53 m/s). The Right median motor nerve showed normal distal onset latency (4.1 ms), normal amplitude (11.2 mV), and normal conduction velocity (Elbow-Wrist, 50 m/s). The Right tibial motor nerve showed normal distal onset latency (4.0 ms), normal amplitude (12.4 mV), and normal conduction velocity (Knee-Ankle, 42 m/s). The Right ulnar motor nerve showed normal distal onset latency (2.8 ms), normal amplitude (10.8 mV), normal conduction velocity (B Elbow-Wrist, 62 m/s), and normal conduction velocity (A Elbow-B Elbow, 63 m/s). The Right median sensory, the Right radial sensory, the Right sural sensory, and the Right ulnar sensory nerves showed normal distal peak latency (R3.5, R2.0, R3.4, R3.1 ms) and normal amplitude (R33.1, R57.6, R13.8, R31.6 µV). The Right Sup Fibular sensory nerve showed normal distal peak latency (2.5 ms), normal amplitude (20.6 µV), and normal conduction velocity (Lower leg-Lat ankle, 53 m/s). All F Wave latencies were within normal limits.         Impression:        ___________________________  Zane Clock, IV, MD              Nerve Conduction Studies  Anti Sensory Summary Table     Site NR Peak (ms) Norm Peak (ms) P-T Amp (µV) Norm P-T Amp Site1 Site2 Dist (cm)   Right Median Anti Sensory (2nd Digit)  33.1°C   Wrist    3.5 <4 33.1 >13 Wrist 2nd Digit 14.0   Elbow    3.5  35.0  Elbow Wrist 0.0   Right Radial Anti Sensory (Base 1st Digit)  29.5°C   Wrist    2.0 <2.8 57.6 >11 Wrist Base 1st Digit 10.0   Right Sup Fibular Anti Sensory (Lat ankle)  28.9°C   Lower leg    2.5 <4.5 20.6 >5 Lower leg Lat ankle 10.0   Site 2    2.4  26.3       Right Sural Anti Sensory (Lat Mall)  30.8°C   Calf    3.4 <4.5 13.8 >4.0 Calf Lat Mall 14.0   Site 2    3.2  10.3       Right Ulnar Anti Sensory (5th Digit)  30.5°C   Wrist    3.1 <4.0 31.6 >9 Wrist 5th Digit 14.0     Motor Summary Table     Site NR Onset (ms) Norm Onset (ms) O-P Amp (mV) Norm O-P Amp Amp% (Prev) Site1 Site2 Dist (cm) Ion (m/s) Norm Ion (m/s)   Right Fibular Motor (Ext Dig Brev)  29.1°C   Ankle    5.1 <6.5 3.9 >2.6 100.0 Ankle Ext Dig Brev 8.0     B Fib    10.9  3.8  97.4 B Fib Ankle 30.0 52 >38   Poplt    12.8  3.6  94.7 Poplt B Fib 10.0 53 >42   Right Median Motor (Abd Poll Brev)  29.1°C   Wrist    4.1 <4.5 11.2 >4.1 100.0 Wrist Abd Poll Brev 8.0     Elbow    7.7  11.1  99.1 Elbow Wrist 18.0 50 >49   Right Tibial Motor (Abd Rodriguez Brev)  29.5°C   Ankle    4.0 <6.1 12.4 >5.3 100.0 Ankle Abd Rodriguez Brev 8.0     Knee    12.1  11.6  93.5 Knee Ankle 34.0 42 >39   Right Ulnar Motor (Abd Dig Minimi)  29.2°C   Wrist    2.8 <3.1 10.8 >7.0 100.0 Wrist Abd Dig Minimi 8.0  >50   B Elbow    5.7  10.2  94.4 B Elbow Wrist 18.0 62 >50   A Elbow    7.3  8.6  84.3 A Elbow B Elbow 10.0 63 >50     F Wave Studies     NR F-Lat (ms) Lat Norm (ms) L-R F-Lat (ms) L-R Lat Norm   Right Tibial (Mrkrs) (Abd Hallucis)  29°C      40.00 <56  <5.7   Right Ulnar (Mrkrs) (Abd Dig Min)  29.5°C      23.67 <32  <2.5     H Reflex Studies     NR H-Lat (ms) L-R H-Lat (ms) L-R Lat Norm   Right Tibial (Gastroc)  28.9°C      30.43  <2.0           Nerve Conduction Studies  Anti Sensory Left/Right Comparison     Site L Lat (ms) R Lat (ms) L-R Lat (ms) L Amp (µV) R Amp (µV) L-R Amp (%) Site1 Site2 L Ion (m/s) R Ion (m/s) L-R Ion (m/s)   Median Anti Sensory (2nd Digit)  33.1°C   Wrist  2.4   33.1  Wrist 2nd Digit  58    Elbow  2.8   35.0  Elbow Wrist      Radial Anti Sensory (Base 1st Digit)  29.5°C   Wrist  1.5   57.6  Wrist Base 1st Digit  67    Sup Fibular Anti Sensory (Lat ankle)  28.9°C   Lower leg  1.9   20.6  Lower leg Lat ankle  53    Site 2  1.9   26.3         Sural Anti Sensory (Lat Mall)  30.8°C   Calf  2.8   13.8  Calf Lat Mall  50    Site 2  2.7   10.3         Ulnar Anti Sensory (5th Digit)  30.5°C   Wrist  2.4   31.6  Wrist 5th Digit  58      Motor Left/Right Comparison     Site L Lat (ms) R Lat (ms) L-R Lat (ms) L Amp (mV) R Amp (mV) L-R Amp (%) Site1 Site2 L Ion (m/s) R Ion (m/s) L-R Ion (m/s)   Fibular Motor (Ext Dig Brev)  29.1°C   Ankle  5.1   3.9  Ankle Ext Dig Brev      B Fib  10.9   3.8  B Fib Ankle  52    Poplt  12.8   3.6  Poplt B Fib  53    Median Motor (Abd Poll Brev)  29.1°C   Wrist  4.1   11.2  Wrist Abd Poll Brev      Elbow  7.7   11.1  Elbow Wrist  50    Tibial Motor (Abd Rodriguez Brev)  29.5°C   Ankle  4.0   12.4  Ankle Abd Rodriguez Brev      Knee  12.1   11.6  Knee Ankle  42    Ulnar Motor (Abd Dig Minimi)  29.2°C   Wrist  2.8   10.8  Wrist Abd Dig Minimi      B Elbow  5.7   10.2  B Elbow Wrist  62    A Elbow  7.3   8.6  A Elbow B Elbow  63          Waveforms:                                                EMG/ NCS Report  DR[facility]ILITATION  - [facility]  [facility] [address] [facility], [address] [place], [address]   Ph: [phone]   FAX: [phone]/ [phone]  Test Date:  2020    Patient: Ronnie Samuels : 1975 Physician: Imelda Connelly MD   Sex: Female Height: ' \" Ref Phys: Preeti Montanez IV, MD   ID#: [id number] Weight:  lbs. Technician: Sidra Arreola     Patient History / Exam:  CC:PARAESTHESIA          EMG & NCV Findings:  Evaluation of the right median motor nerve showed normal distal onset latency (3.8 ms), normal amplitude (12.6 mV), and normal conduction velocity (Elbow-Wrist, 53 m/s).   The right ulnar motor nerve showed normal distal onset latency (2.7 ms), normal amplitude (12.0 mV), normal conduction velocity (B Elbow-Wrist, 67 m/s), and normal conduction velocity (A Elbow-B Elbow, 56 m/s). The right median sensory, the right radial sensory, the right sural sensory, and the right ulnar sensory nerves showed normal distal peak latency (R3.4, R1.7, R3.1, R3.1 ms) and normal amplitude (R51.1, R49.4, R18.3, R38.9 µV). All F Wave latencies were within normal limits. All examined muscles (as indicated in the following table) showed no evidence of electrical instability.         Impression:        ___________________________  Terrance [place], IV, MD      Nerve Conduction Studies  Anti Sensory Summary Table     Stim Site NR Peak (ms) Norm Peak (ms) P-T Amp (µV) Norm P-T Amp Site1 Site2 Dist (cm)   Right Median Anti Sensory (2nd Digit)  34.6°C   Wrist    3.4 <4 51.1 >13 Wrist 2nd Digit 14.0   Elbow    3.6  52.3  Elbow Wrist 0.0   Right Radial Anti Sensory (Base 1st Digit)  32.6°C   Wrist    1.7 <2.8 49.4 >11 Wrist Base 1st Digit 10.0   Right Sural Anti Sensory (Lat Mall)  30.4°C   Calf    3.1 <4.5 18.3 >4.0 Calf Lat Mall 14.0   Site 2    3.2  12.6       Right Ulnar Anti Sensory (5th Digit)  33.9°C   Wrist    3.1 <4.0 38.9 >9 Wrist 5th Digit 14.0   B Elbow    3.0  41.3  B Elbow Wrist 0.0     Ortho Sensory Summary Table     Stim Site NR Peak (ms) P-T Amp (µV) Site1 Site2 Dist (cm) Ion (m/s)   Right Medial Plantar Ortho Sensory (Med Malleolus)  25.9°C   Digit 1    2.3 170.7 Digit 1 Med Malleolus 11.0 48     Motor Summary Table     Stim Site NR Onset (ms) Norm Onset (ms) O-P Amp (mV) Norm O-P Amp Amp (Prev) (%) Site1 Site2 Dist (cm) Ion (m/s) Norm Ion (m/s)   Right Median Motor (Abd Poll Brev)  32.1°C   Wrist    3.8 <4.5 12.6 >4.1 100.0 Wrist Abd Poll Brev 8.0     Elbow    7.2  10.7  84.9 Elbow Wrist 18.0 53 >49   Right Ulnar Motor (Abd Dig Minimi)  30.6°C   Wrist    2.7 <3.1 12.0 >7.0 100.0 Wrist Abd Dig Minimi 8.0  >50   B Elbow    5.4  11.3  94.2 B Elbow Wrist 18.0 67 >50   A Elbow    7.2  10.5  92.9 A Elbow B Elbow 10.0 56 >50     F Wave Studies     NR F-Lat (ms) Lat Norm (ms) L-R F-Lat (ms) L-R Lat Norm   Right Median (Mrkrs) (Abd Poll Brev)  29.9°C      25.01 <31  <2.2     EMG     Side Muscle Nerve Root Ins Act Fibs Psw Recrt Duration Amp Poly Comment   Right Ext Dig Brev Dp Br Peron L5, S1 Nml Nml Nml Nml Nml Nml Nml    Right AntTibialis Dp Br Peron L4-5 Nml Nml Nml Nml Nml Nml Nml    Right MedGastroc Tibial S1-2 Nml Nml Nml Nml Nml Nml Nml    Right VastusMed Femoral L2-4 Nml Nml Nml Nml Nml Nml Nml    Right BicepsFemL Sciatic L5-S2 Nml Nml Nml Nml Nml Nml Nml    Right Abd Poll Brev Median C8-T1 Nml Nml Nml Nml Nml Nml Nml    Right BrachioRad Radial C5-6 Nml Nml Nml Nml Nml Nml Nml    Right Biceps Musculocut C5-6 Nml Nml Nml Nml Nml Nml Nml    Right Triceps Radial C6-7-8 Nml Nml Nml Nml Nml Nml Nml    Right Deltoid Axillary C5-6 Nml Nml Nml Nml Nml Nml Nml                Nerve Conduction Studies  Anti Sensory Left/Right Comparison     Stim Site L Lat (ms) R Lat (ms) L-R Lat (ms) L Amp (µV) R Amp (µV) L-R Amp (%) Site1 Site2 L Ion (m/s) R Ion (m/s) L-R Ion (m/s)   Median Anti Sensory (2nd Digit)  34.6°C   Wrist  2.6   51.1  Wrist 2nd Digit  54    Elbow  2.8   52.3  Elbow Wrist      Radial Anti Sensory (Base 1st Digit)  32.6°C   Wrist  1.3   49.4  Wrist Base 1st Digit  77    Sural Anti Sensory (Lat Mall)  30.4°C   Calf  2.5   18.3  Calf Lat Mall  56    Site 2  2.5   12.6         Ulnar Anti Sensory (5th Digit)  33.9°C   Wrist  2.3   38.9  Wrist 5th Digit  61    B Elbow  2.4   41.3  B Elbow Wrist        Ortho Sensory Left/Right Comparison     Stim Site L Lat (ms) R Lat (ms) L-R Lat (ms) L Amp (µV) R Amp (µV) L-R Amp (%) Site1 Site2 L Ion (m/s) R Ion (m/s) L-R Ion (m/s)   Medial Plantar Ortho Sensory (Med Malleolus)  25.9°C   Digit 1  1.2   170.7  Digit 1 Med Malleolus  48      Motor Left/Right Comparison Stim Site L Lat (ms) R Lat (ms) L-R Lat (ms) L Amp (mV) R Amp (mV) L-R Amp (%) Site1 Site2 L Ion (m/s) R Ion (m/s) L-R Ion (m/s)   Median Motor (Abd Poll Brev)  32.1°C   Wrist  3.8   12.6  Wrist Abd Poll Brev      Elbow  7.2   10.7  Elbow Wrist  53    Ulnar Motor (Abd Dig Minimi)  30.6°C   Wrist  2.7   12.0  Wrist Abd Dig Minimi      B Elbow  5.4   11.3  B Elbow Wrist  67    A Elbow  7.2   10.5  A Elbow B Elbow  56          Waveforms:

## 2020-01-07 NOTE — PROGRESS NOTES
This was an elective EMG and nerve conduction of patient's symptomatic right arm and leg. The concerns based on historical and exam evidence went to an acquired polyneuropathy. Patient history. Patient describes 2 years worth of hand and palm numbness and some numbness in the face about 2 months ago. Special sensory function bulbar function bowel and bladder were working okay. No background history of diabetes. No history of connective tissue disorders. Patient exam.  Alert cooperative articulate and appropriate. Cranial nerves II through XII normal.  Cerebellar testing finger-nose-finger toe to finger intact. Motor 5/5. Sensory intact to touch temperature and vibratory. Reflexes +2. Gait and transfers normal.  Provocative maneuvers all normal.    EMG nerve conduction findings. 1.  Needle insertion and probing was uniformly normal.  Patient tolerated this without exception. No evidence of acute denervation or chronic denervation/reinnervation or myopathic potentials. Motor unit recruitment as to number morphology and time sequencing normal.    2.  The nerve conduction portion appeared to be normal.  This included right medial plantar sensory assessment along with right tibial and ulnar F waves and right tibial H reflexes. Impression: This is a normal EMG and nerve conduction assessment of patient's symptomatic right arm and leg. The study does not rule out small fiber sensory neuropathy. Clinical correlation is advised.   NEHEMIAH BAEZ.

## 2020-01-13 DIAGNOSIS — R20.2 TINGLING IN EXTREMITIES: ICD-10-CM

## 2020-02-10 ENCOUNTER — OFFICE VISIT (OUTPATIENT)
Dept: NEUROLOGY | Age: 45
End: 2020-02-10

## 2020-02-10 VITALS
HEART RATE: 61 BPM | BODY MASS INDEX: 30.57 KG/M2 | WEIGHT: 166.1 LBS | RESPIRATION RATE: 18 BRPM | OXYGEN SATURATION: 97 % | DIASTOLIC BLOOD PRESSURE: 68 MMHG | HEIGHT: 62 IN | SYSTOLIC BLOOD PRESSURE: 116 MMHG

## 2020-02-10 DIAGNOSIS — M79.7 FIBROMYALGIA: ICD-10-CM

## 2020-02-10 NOTE — PROGRESS NOTES
Neurology Consult      Subjective:      Monica Chua is a 38 y.o. female who comes in today on follow-up of testing for sensory alterations as previously highlighted. Lab work was normal and the EMG and nerve conduction assessment of a symptomatic arm and leg was normal as well. Went over the concept of small fiber sensory neuropathy and how this entity was not ruled out with the machinery employed. We talked about the small fiber sensory neuropathy biopsy and also the probable very low yield on MRI imaging as in MS concerns. As far as the biopsy is concerned she would understand that even with a positive biopsy it does not give us a one-to-one correlation in terms of diagnosis. She agreed given her age of 38 etc. that MS would not make a lot of sense. At this point patient referenced her observation of her own chronology of symptoms and has concerns that go to fibromyalgia. We certainly do treat overlap disease with rheumatology and I think that would be a very worthwhile referral, all things considered. We will make the referral and she in the meantime will check her insurance coverage for the same. Current Outpatient Medications   Medication Sig Dispense Refill    MAGNESIUM PO Take 300 mg by mouth.  turmeric (CURCUMIN) by Does Not Apply route.  cyanocobalamin 1,000 mcg tablet Take 1,000 mcg by mouth daily.  5-hydroxytryptophan, 5-HTP, (5-HTP PO) Take  by mouth.  SLIPPERY ELM BARK PO Take  by mouth.  apixaban (ELIQUIS) 5 mg tablet Take 1 Tab by mouth two (2) times a day. 60 Tab 3    norethindrone acetate (AYGESTIN) 5 mg tablet Take 5 mg by mouth three (3) times daily.  cholecalciferol (VITAMIN D3) 5,000 unit capsule cholecalciferol (vitamin D3) 5,000 unit capsule      CANNABIDIOL, CBD, EXTRACT PO Take  by mouth two (2) times a day.  docusate sodium (COLACE) 100 mg capsule Take 100 mg by mouth daily.       ferrous sulfate (IRON) 325 mg (65 mg iron) EC tablet Take 1 Tab by mouth every other day for 90 days. 90 Tab 1      Allergies   Allergen Reactions    Aspirin Shortness of Breath    Nsaids (Non-Steroidal Anti-Inflammatory Drug) Shortness of Breath     Past Medical History:   Diagnosis Date    Anxiety disorder     Arthritis     Asthma     Chronic pain     D[facility] 10/2019    Dysthymic     Fatigue     Graves disease     Headache     Joint pain     Migraines     Neuropathy     SOB (shortness of breath)     Tinnitus       Past Surgical History:   Procedure Laterality Date    HX GYN      tubal ligation    HX HEENT      Multiple nasal polyp removal    HX WISDOM TEETH EXTRACTION        Social History     Socioeconomic History    Marital status:      Spouse name: Not on file    Number of children: Not on file    Years of education: Not on file    Highest education level: Not on file   Occupational History    Not on file   Social Needs    Financial resource strain: Not on file    Food insecurity:     Worry: Not on file     Inability: Not on file    Transportation needs:     Medical: Not on file     Non-medical: Not on file   Tobacco Use    Smoking status: Never Smoker    Smokeless tobacco: Never Used   Substance and Sexual Activity    Alcohol use:  Yes     Alcohol/week: 3.0 standard drinks     Types: 1 Cans of beer, 1 Shots of liquor, 1 Standard drinks or equivalent per week    Drug use: Never    Sexual activity: Yes     Partners: Male   Lifestyle    Physical activity:     Days per week: Not on file     Minutes per session: Not on file    Stress: Not on file   Relationships    Social connections:     Talks on phone: Not on file     Gets together: Not on file     Attends Tenriism service: Not on file     Active member of club or organization: Not on file     Attends meetings of clubs or organizations: Not on file     Relationship status: Not on file    Intimate partner violence:     Fear of current or ex partner: Not on file     Emotionally abused: Not on file     Physically abused: Not on file     Forced sexual activity: Not on file   Other Topics Concern    Not on file   Social History Narrative    Not on file      Family History   Problem Relation Age of Onset    Heart Disease Mother 77    Hypertension Mother     Hypertension Father     Heart Disease Father     Stroke Father       Visit Vitals  /68   Pulse 61   Resp 18   Ht 5' 2\" (1.575 m)   Wt 75.3 kg (166 lb 1.6 oz)   LMP 01/10/2020 (Exact Date)   SpO2 97%   BMI 30.38 kg/m²        Review of Systems:   A comprehensive review of systems was negative except for that written in the HPI. Neuro Exam:     Appearance: The patient is well developed, well nourished, provides a coherent history and is in no acute distress. Mental Status: Oriented to time, place and person. Mood and affect appropriate. Cranial Nerves:   Intact visual fields. Fundi are benign. ALAN, EOM's full, no nystagmus, no ptosis. Facial sensation is normal. Corneal reflexes are intact. Facial movement is symmetric. Hearing is normal bilaterally. Palate is midline with normal sternocleidomastoid and trapezius muscles are normal. Tongue is midline. Motor:  5/5 strength in upper and lower proximal and distal muscles. Normal bulk and tone. No fasciculations. Reflexes:   Deep tendon reflexes 2+/4 and symmetrical.   Sensory:   Normal to touch, pinprick and vibration. Gait:  Normal gait. Tremor:   No tremor noted. Cerebellar:  No cerebellar signs present. Neurovascular:  Normal heart sounds and regular rhythm, peripheral pulses intact, and no carotid bruits. Assessment:   Paresthesias. We went over the results of testing that included normal labs and EMG and nerve conduction. Please see above dictation. Interestingly patient has been keeping close tabs on her symptom complex and thinks she has a quick affiliation toward fibromyalgia.   We can share any and all information as it goes to this neuro work-up. Revisit as needed. Good luck. Plan:   GAYATRI prn. ..   Signed by :  Domitila Daniel MD

## 2020-02-10 NOTE — LETTER
2/10/20 Patient: Anand San YOB: 1975 Date of Visit: 2/10/2020 Joel Medrano MD 
[address] [facility] [address] [facility] [address] VIA In Basket Dear Joel Medrano MD, Thank you for referring Ms. Silva Hsieh to [facility] for evaluation. My notes for this consultation are attached. If you have questions, please do not hesitate to call me. I look forward to following your patient along with you.  
 
 
Sincerely, 
 
Joseph Norman MD

## 2020-02-10 NOTE — PATIENT INSTRUCTIONS
Patient history reviewed patient examined. Went over test results which include the labs and the EMG and nerve conduction. At this point cannot rule out a small fiber sensory neuropathy in the scheme of things. Patient however is Careful tabulation of her symptom complex and is concerned about fibromyalgia. We certainly treat overlap disease with rheumatology, and I will be more than glad to share the case with a formal referral.  Good luck.

## 2020-02-12 ENCOUNTER — TELEPHONE (OUTPATIENT)
Dept: INTERNAL MEDICINE CLINIC | Age: 45
End: 2020-02-12

## 2020-02-12 NOTE — PROGRESS NOTES
Assessment and Plan   Diagnoses and all orders for this visit:    1. Iron deficiency anemia due to chronic blood loss  Followed by heme-onc    2. Gluten intolerance  -     CELIAC ANTIBODY PROFILE; Future    3. Globus sensation  Not interested in seeing ENT at this time    Need to get records [facility] Pap [facility] Dr. Helder Lambert    Was seen by neuro after last visit. Do not think is multiple sclerosis. Has an appointment with rheumatology for evaluation of fibromyalgia. EMG normal    Return to clinic: 6 months for general follow-up, follow-up on consults    Renetta Sandoval MD  [facility]  2/13/2020    Future Appointments   Date Time Provider Yohana Dodson   3/17/2020  9:00 Peyton Contreras  [address], [address]        Subjective   Chief Complaint   Follow-up    Monica Manzanares is a 38 y.o. female     Anemia- currently on progesterone by her OB/GYN to help prevent bleeding. Following with heme-onc and getting iron lesions. Gluten sensitivity-requesting testing for celiac disease. Reports that when she eats gluten, she has stomach pain, bloating, changes in her bowels although not necessarily constipation or diarrhea. Globus sensation- has not been much of an issue recently. Review of Systems   Constitutional: Negative for chills and fever. Respiratory: Negative for shortness of breath. Gastrointestinal: Positive for abdominal pain. Objective   Vitals:       Visit Vitals  /84 (BP 1 Location: Left arm, BP Patient Position: Sitting)   Pulse 63   Temp 98.5 °F (36.9 °C) (Oral)   Resp 18   Ht 5' 2\" (1.575 m)   Wt 166 lb 8 oz (75.5 kg)   LMP 01/07/2020   SpO2 98%   BMI 30.45 kg/m²        Physical Exam  Constitutional:       Appearance: Normal appearance. Cardiovascular:      Rate and Rhythm: Normal rate and regular rhythm. Heart sounds: No murmur. No friction rub. No gallop.     Pulmonary:      Effort: No respiratory distress. Breath sounds: No wheezing, rhonchi or rales. Abdominal:      General: Bowel sounds are normal. There is no distension. Palpations: Abdomen is soft. Tenderness: There is no abdominal tenderness. Neurological:      Mental Status: She is alert.           Voice recognition software is utilized and this note may contain transcription errors

## 2020-02-12 NOTE — TELEPHONE ENCOUNTER
Please send patient's name on for billing issues with DR. Del Toro. Patient is also requesting something in righting at visit that PCP is credentialed.      Appointment -  Thursday, February 13, 2020 07:45 AM

## 2020-02-13 ENCOUNTER — OFFICE VISIT (OUTPATIENT)
Dept: INTERNAL MEDICINE CLINIC | Age: 45
End: 2020-02-13

## 2020-02-13 VITALS
WEIGHT: 166.5 LBS | SYSTOLIC BLOOD PRESSURE: 120 MMHG | HEIGHT: 62 IN | RESPIRATION RATE: 18 BRPM | TEMPERATURE: 98.5 F | DIASTOLIC BLOOD PRESSURE: 84 MMHG | OXYGEN SATURATION: 98 % | HEART RATE: 63 BPM | BODY MASS INDEX: 30.64 KG/M2

## 2020-02-13 DIAGNOSIS — D50.0 IRON DEFICIENCY ANEMIA DUE TO CHRONIC BLOOD LOSS: Primary | ICD-10-CM

## 2020-02-13 DIAGNOSIS — K90.41 GLUTEN INTOLERANCE: ICD-10-CM

## 2020-02-13 DIAGNOSIS — R09.89 GLOBUS SENSATION: ICD-10-CM

## 2020-02-13 RX ORDER — NORETHINDRONE 5 MG/1
TABLET ORAL
COMMUNITY
End: 2021-02-23

## 2020-02-14 LAB
IGA,SERUM: 285 MG/DL (ref 47–310)
INTERPRETATION, 86010040: NORMAL
TISSUE TRANSGLUTAMINASE AB, IGA, HDL3903: 1 U/ML

## 2020-02-14 NOTE — PROGRESS NOTES
Graine de Cadeauxt message sent. Celiac panel negative.   Recommend avoiding gluten for symptomatic relief

## 2020-03-11 ENCOUNTER — HOSPITAL ENCOUNTER (OUTPATIENT)
Dept: GENERAL RADIOLOGY | Age: 45
Discharge: HOME | End: 2020-03-11
Attending: INTERNAL MEDICINE
Payer: COMMERCIAL

## 2020-03-11 DIAGNOSIS — M51.36 DDD (DEGENERATIVE DISC DISEASE), LUMBAR: ICD-10-CM

## 2020-03-11 PROCEDURE — 72202 X-RAY EXAM SI JOINTS 3/> VWS: CPT

## 2020-03-11 PROCEDURE — 72100 X-RAY EXAM L-S SPINE 2/3 VWS: CPT

## 2020-03-13 LAB
% SATURATION: 56 % (CALC) (ref 16–45)
ABSOLUTE BANDS, 67058: NORMAL
ABSOLUTE BLASTS: NORMAL
ABSOLUTE METAMYELOCYTES, 900360: NORMAL
ABSOLUTE MYELOCYTES: NORMAL
ABSOLUTE NRBC,ANRBC: NORMAL
ABSOLUTE PROMYELOCYTES: NORMAL
BANDS,BANDS: NORMAL
BASOPHILS # BLD: 78 CELLS/UL (ref 0–200)
BASOPHILS NFR BLD: 1 %
BLASTS,BLAST: NORMAL
COMMENT(S): NORMAL
EOSINOPHIL # BLD: 140 CELLS/UL (ref 15–500)
EOSINOPHIL NFR BLD: 1.8 %
ERYTHROCYTE [DISTWIDTH] IN BLOOD BY AUTOMATED COUNT: 12.5 % (ref 11–15)
FERRITIN SERPL-MCNC: 271 NG/ML (ref 16–232)
HCT VFR BLD AUTO: 43.8 % (ref 35–45)
HGB BLD-MCNC: 14.9 G/DL (ref 11.7–15.5)
IRON,IRN: 152 MCG/DL (ref 40–190)
LYMPHOCYTES # BLD: 2816 CELLS/UL (ref 850–3900)
LYMPHOCYTES NFR BLD: 36.1 %
MCH RBC QN AUTO: 29.9 PG (ref 27–33)
MCHC RBC AUTO-ENTMCNC: 34 G/DL (ref 32–36)
MCV RBC AUTO: 87.8 FL (ref 80–100)
METAMYELOCYTES,METAS: NORMAL
MONOCYTES # BLD: 538 CELLS/UL (ref 200–950)
MONOCYTES NFR BLD: 6.9 %
MYELOCYTES,MYELO: NORMAL
NEUTROPHILS # BLD AUTO: 4228 CELLS/UL (ref 1500–7800)
NEUTROPHILS # BLD: 54.2 %
NRBC: NORMAL
PLATELET # BLD AUTO: 354 THOUSAND/UL (ref 140–400)
PMV BLD AUTO: 9.8 FL (ref 7.5–12.5)
PROMYELOCYTES,PRO: NORMAL
RBC # BLD AUTO: 4.99 MILLION/UL (ref 3.8–5.1)
REACTIVE LYMPHS: NORMAL
TIBC SERPL-MCNC: 270 MCG/DL (CALC) (ref 250–450)
WBC # BLD AUTO: 7.8 THOUSAND/UL (ref 3.8–10.8)

## 2020-03-16 ENCOUNTER — TELEPHONE (OUTPATIENT)
Dept: ONCOLOGY | Age: 45
End: 2020-03-16

## 2020-03-16 NOTE — TELEPHONE ENCOUNTER
LVM telling patient about. In an effort to protect our patients and provide social distance we are asking that you call from your vehicle upon arrival to our office. We will perform the check in process over the phone and a nurse will call you when a room is available for you. The direct number to call is [phone]. If you do not have a cell phone and are unable to call upon arrival, please come to the desk in the lobby to notify the [facility] that you have arrived. Patient said they were fine.

## 2020-03-16 NOTE — PROGRESS NOTES
[address] [facility] Oncology at [address] [place] [address]  [phone]    Hematology / Oncology Established Visit    Reason for Visit:   Mimi Jay is a 38 y.o. female who comes in for f/u of DVT and anemia. Initially referred by Dr. Jena Manning. History of Present Illness:   Monica Szymanski is a 38 y.o. female with h/o DVT comes in for management of acute DVT. Patient presented to [facility] ED on 10/30/19 with left arm pain, swelling and coldness (3 day h/o). Patient did have a prior DVT in E while 7 mo pregnant with her daughter 15 yrs ago. At that time, she was treated with Lovenox throughout most of the pregnancy, then transitioned to Heparin injections the last 3 weeks of pregnancy. After delivery, she was placed on Warfarin for 6 months. Given her prior h/o DVT during pregnancy approx 15 yrs ago, patient underwent ultrasound doppler exam, which was notable for left subclavian and axillary vein DVT. No recent surgeries, arm trauma, OCPs, 8 hr long trips. No recent infections. She had a corticosteroid injection in her C-spine region midline per patient. She does exercise regularly since June 2019. She was started on Xarelto. She also has a h/o tubal ligation performed 14 yrs ago. Prior to starting Xarelto, she had a few days of spotting. However, after starting Xarelto, she developed persistent heavy vaginal bleeding requiring 1 pad every hour. She was started on Megace by Gyn on 11/4/19. Her menstrual bleeding has decreased slightly, but still having clotting and bleeding, now requiring pad change every 2 hours. Mother had vascular problems including atherosclerosis, but did not have blood clots. Interval History: Patient here for follow up of DVT and anemia. Recent labs show resolved anemia. She received injectafer x 2 early December 2019. She did have n/v, abdominal cramping with the iron supplements and is not currently taking them. She is on progesterone and is no longer having periods. She is switched from xarelto to eliquis in December; tolerating well. Past Medical History:   Diagnosis Date    Anxiety disorder     Arthritis     Asthma     Chronic pain     DVT (deep venous thrombosis) ([facility]) 10/2019    Dysthymic     Fatigue     Graves disease     Headache     Joint pain     Migraines     Neuropathy     SOB (shortness of breath)     Tinnitus       Past Surgical History:   Procedure Laterality Date    HX GYN      tubal ligation    HX HEENT      Multiple nasal polyp removal    HX WISDOM TEETH EXTRACTION        Social History     Tobacco Use    Smoking status: Never Smoker    Smokeless tobacco: Never Used   Substance Use Topics    Alcohol use: Yes     Alcohol/week: 3.0 standard drinks     Types: 1 Cans of beer, 1 Shots of liquor, 1 Standard drinks or equivalent per week      Family History   Problem Relation Age of Onset    Heart Disease Mother 77    Hypertension Mother     Hypertension Father     Heart Disease Father     Stroke Father      Current Outpatient Medications   Medication Sig    norethindrone acetate (AYGESTIN) 5 mg tablet norethindrone acetate 5 mg tablet   take one tablet daily 3x per day    MAGNESIUM PO Take 300 mg by mouth.  turmeric (CURCUMIN) by Does Not Apply route.  cyanocobalamin 1,000 mcg tablet Take 1,000 mcg by mouth daily.  5-hydroxytryptophan, 5-HTP, (5-HTP PO) Take  by mouth.  SLIPPERY ELM BARK PO Take  by mouth.  docusate sodium (COLACE) 100 mg capsule Take 100 mg by mouth daily.  apixaban (ELIQUIS) 5 mg tablet Take 1 Tab by mouth two (2) times a day.  norethindrone acetate (AYGESTIN) 5 mg tablet Take 5 mg by mouth three (3) times daily.  cholecalciferol (VITAMIN D3) 5,000 unit capsule cholecalciferol (vitamin D3) 5,000 unit capsule    CANNABIDIOL, CBD, EXTRACT PO Take  by mouth two (2) times a day. No current facility-administered medications for this visit.        Allergies   Allergen Reactions    Aspirin Shortness of Breath    Nsaids (Non-Steroidal Anti-Inflammatory Drug) Shortness of Breath        Review of Systems: A complete review of systems was obtained, negative except as described above. Physical Exam:     Visit Vitals  /81 (BP 1 Location: Left arm, BP Patient Position: Sitting)   Pulse 74   Temp 97.9 °F (36.6 °C) (Oral)   Ht 5' 2\" (1.575 m)   Wt 167 lb 3.2 oz (75.8 kg)   SpO2 97%   BMI 30.58 kg/m²     ECOG PS: 0  General: Well developed, no acute distress  Eyes: PERRLA, EOMI, anicteric sclerae  HENT: Atraumatic, OP clear, TMs intact without erythema  Neck: Supple  Lymphatic: No cervical, supraclavicular, axillary or inguinal adenopathy  Respiratory: CTAB, normal respiratory effort  CV: Normal rate, regular rhythm, no murmurs. Left arm prior swelling improved. GI: Soft, nontender, nondistended, no masses, no hepatomegaly, no splenomegaly  MS: Normal gait and station. Digits without clubbing or cyanosis. Skin: No rashes, ecchymoses, or petechiae. Normal temperature, turgor, and texture. Neuro/Psych: Alert, oriented. 5/5 strength in all 4 extremities. Appropriate affect, normal judgment/insight. Results:     Lab Results   Component Value Date/Time    WBC 7.8 03/12/2020 09:00 AM    HGB 14.9 03/12/2020 09:00 AM    HCT 43.8 03/12/2020 09:00 AM    PLATELET 900 44/55/6927 09:00 AM    MCV 87.8 03/12/2020 09:00 AM    ABS.  NEUTROPHILS 4,228 03/12/2020 09:00 AM     Lab Results   Component Value Date/Time    Sodium 141 12/31/2019 08:05 AM    Potassium 3.8 12/31/2019 08:05 AM    Chloride 112 (H) 12/31/2019 08:05 AM    CO2 19 (L) 12/31/2019 08:05 AM    Glucose 82 12/31/2019 08:05 AM    BUN 8 12/31/2019 08:05 AM    Creatinine 0.84 12/31/2019 08:05 AM    GFR est AA 97 12/31/2019 08:05 AM    GFR est non-AA 84 12/31/2019 08:05 AM    Calcium 8.8 12/31/2019 08:05 AM     Lab Results   Component Value Date/Time    Bilirubin, total 0.3 12/31/2019 08:05 AM    ALT (SGPT) 33 (H) 12/31/2019 08:05 AM    AST (SGOT) 19 12/31/2019 08:05 AM    Alk. phosphatase 56 12/31/2019 08:05 AM    Protein, total 6.5 12/31/2019 08:05 AM    Protein, total 6.8 12/31/2019 08:05 AM    Albumin 4.1 12/31/2019 08:05 AM    Albumin 4.0 12/31/2019 08:05 AM    Globulin 2.4 12/31/2019 08:05 AM     Lab Results   Component Value Date/Time    Iron 152 03/12/2020 09:00 AM    Iron binding capacity 270 03/12/2020 09:00 AM    Ferritin 271 (H) 03/12/2020 09:00 AM       Lab Results   Component Value Date/Time    Vitamin B12 778 12/31/2019 08:05 AM    Folate 19.3 12/31/2019 08:05 AM     Lab Results   Component Value Date/Time    TSH 1.13 11/14/2019 12:00 AM     No results found for: HAMAT, HAAB, HABT, HAAT, HBSAG, HBSB, HBSAT, [address] [facility], [facility], [facility] [address], [address] [facility], [facility], [address] [facility], [address], [address] [facility], [address] [facility] [place], [address] [facility], [address] [address], [id number], [id number], [address] [place] [address], [id number], HBCMLT, [id number], HCGAT      Imaging:     10/30/19 upper extremity doppler: Left upper extremity venous duplex is positive for acute occlusive deep venous thrombosis involving subclavian and axillary veins. Right common femoral vein is thrombus free. Assessment & Plan:   Monica Dailey is a 38 y.o. female comes in with DVT. 1. LUE DVT: Acute DVT involving left subclavian and axillary veins. This is an unprovoked event and her 2nd lifetime thrombotic event, which will require lifelong anticoagulation. I discussed risks/benefits of continuing on anticoagulation indefinitely. Given past menorrhagia, switched from xarelto to Eliquis which has a slightly lower risk of bleeding.   -- Continue anticoagulation indefinitely  -- Eliquis 5mg bid. -- Return in 12 months for f/u    2. H/o LLE DVT: Attributed to pregnancy and completed 6 months of anticoagulation at that time. No family h/o thrombosis. 3. Hx menorrhagia:  Is taking a Norethindrone (Progesterone like med) for this and no longer has periods.    -- f/u with Gyn.     4. H/o Iron deficiency anemia: Now improved s/p Injectafer in 12/2019 and with resolution of menorrhagia. -- Recheck CBC and iron profile in 12 months. I appreciate the opportunity to participate in Ms. Monica Arias's care.     Signed By: Marc Yi MD     March 17, 2020

## 2020-03-17 ENCOUNTER — OFFICE VISIT (OUTPATIENT)
Dept: ONCOLOGY | Age: 45
End: 2020-03-17

## 2020-03-17 VITALS
HEIGHT: 62 IN | DIASTOLIC BLOOD PRESSURE: 81 MMHG | TEMPERATURE: 97.9 F | HEART RATE: 74 BPM | SYSTOLIC BLOOD PRESSURE: 114 MMHG | OXYGEN SATURATION: 97 % | WEIGHT: 167.2 LBS | BODY MASS INDEX: 30.77 KG/M2

## 2020-03-17 DIAGNOSIS — Z87.42 HISTORY OF MENORRHAGIA: ICD-10-CM

## 2020-03-17 DIAGNOSIS — Z86.718 HISTORY OF DVT (DEEP VEIN THROMBOSIS): Primary | ICD-10-CM

## 2020-03-17 DIAGNOSIS — Z86.39 HISTORY OF IRON DEFICIENCY: ICD-10-CM

## 2020-03-17 DIAGNOSIS — N92.0 MENORRHAGIA WITH REGULAR CYCLE: ICD-10-CM

## 2020-03-17 RX ORDER — AMITRIPTYLINE HYDROCHLORIDE 10 MG/1
TABLET, FILM COATED ORAL
COMMUNITY
Start: 2020-03-03 | End: 2021-02-23

## 2020-07-13 DIAGNOSIS — Z86.718 HISTORY OF DVT (DEEP VEIN THROMBOSIS): ICD-10-CM

## 2020-12-16 DIAGNOSIS — Z86.718 HISTORY OF DVT (DEEP VEIN THROMBOSIS): ICD-10-CM

## 2020-12-16 RX ORDER — APIXABAN 5 MG/1
TABLET, FILM COATED ORAL
Qty: 180 TAB | Refills: 1 | Status: SHIPPED | OUTPATIENT
Start: 2020-12-16 | End: 2021-03-31 | Stop reason: SDUPTHER

## 2021-02-23 ENCOUNTER — HOSPITAL ENCOUNTER (OUTPATIENT)
Dept: PREADMISSION TESTING | Age: 46
Discharge: HOME | End: 2021-02-23
Payer: COMMERCIAL

## 2021-02-23 VITALS
HEART RATE: 71 BPM | OXYGEN SATURATION: 98 % | RESPIRATION RATE: 20 BRPM | SYSTOLIC BLOOD PRESSURE: 133 MMHG | TEMPERATURE: 97.8 F | BODY MASS INDEX: 32.9 KG/M2 | WEIGHT: 178.79 LBS | DIASTOLIC BLOOD PRESSURE: 81 MMHG | HEIGHT: 62 IN

## 2021-02-23 LAB
% SATURATION: 36 % (CALC) (ref 16–45)
ABO + RH BLD: NORMAL
BASOPHILS # BLD: 0.1 K/UL (ref 0–0.1)
BASOPHILS # BLD: 139 CELLS/UL (ref 0–200)
BASOPHILS NFR BLD: 1.8 %
BASOPHILS NFR BLD: 2 % (ref 0–1)
BLOOD GROUP ANTIBODIES SERPL: NORMAL
DIFFERENTIAL METHOD BLD: ABNORMAL
EOSINOPHIL # BLD: 0.2 K/UL (ref 0–0.4)
EOSINOPHIL # BLD: 231 CELLS/UL (ref 15–500)
EOSINOPHIL NFR BLD: 3 %
EOSINOPHIL NFR BLD: 3 % (ref 0–7)
ERYTHROCYTE [DISTWIDTH] IN BLOOD BY AUTOMATED COUNT: 13 % (ref 11–15)
ERYTHROCYTE [DISTWIDTH] IN BLOOD BY AUTOMATED COUNT: 13.3 % (ref 11.5–14.5)
FERRITIN SERPL-MCNC: 149 NG/ML (ref 16–232)
HCG UR QL: NEGATIVE
HCT VFR BLD AUTO: 46.2 % (ref 35–47)
HCT VFR BLD AUTO: 48.6 % (ref 35–45)
HGB BLD-MCNC: 15.8 G/DL (ref 11.5–16)
HGB BLD-MCNC: 15.8 G/DL (ref 11.7–15.5)
IMM GRANULOCYTES # BLD AUTO: 0 K/UL (ref 0–0.04)
IMM GRANULOCYTES NFR BLD AUTO: 0 % (ref 0–0.5)
IRON,IRN: 116 MCG/DL (ref 40–190)
LYMPHOCYTES # BLD: 2.7 K/UL (ref 0.8–3.5)
LYMPHOCYTES # BLD: 2780 CELLS/UL (ref 850–3900)
LYMPHOCYTES NFR BLD: 35 % (ref 12–49)
LYMPHOCYTES NFR BLD: 36.1 %
MCH RBC QN AUTO: 28.6 PG (ref 27–33)
MCH RBC QN AUTO: 29.3 PG (ref 26–34)
MCHC RBC AUTO-ENTMCNC: 32.5 G/DL (ref 32–36)
MCHC RBC AUTO-ENTMCNC: 34.2 G/DL (ref 30–36.5)
MCV RBC AUTO: 85.7 FL (ref 80–99)
MCV RBC AUTO: 87.9 FL (ref 80–100)
MONOCYTES # BLD: 0.6 K/UL (ref 0–1)
MONOCYTES # BLD: 601 CELLS/UL (ref 200–950)
MONOCYTES NFR BLD: 7.8 %
MONOCYTES NFR BLD: 8 % (ref 5–13)
NEUTROPHILS # BLD AUTO: 3950 CELLS/UL (ref 1500–7800)
NEUTROPHILS # BLD: 51.3 %
NEUTS SEG # BLD: 3.9 K/UL (ref 1.8–8)
NEUTS SEG NFR BLD: 52 % (ref 32–75)
NRBC # BLD: 0 K/UL (ref 0–0.01)
NRBC BLD-RTO: 0 PER 100 WBC
PLATELET # BLD AUTO: 307 K/UL (ref 150–400)
PLATELET # BLD AUTO: 356 THOUSAND/UL (ref 140–400)
PMV BLD AUTO: 10.2 FL (ref 8.9–12.9)
PMV BLD AUTO: 10.3 FL (ref 7.5–12.5)
RBC # BLD AUTO: 5.39 M/UL (ref 3.8–5.2)
RBC # BLD AUTO: 5.53 MILLION/UL (ref 3.8–5.1)
SPECIMEN EXP DATE BLD: NORMAL
TIBC SERPL-MCNC: 324 MCG/DL (CALC) (ref 250–450)
WBC # BLD AUTO: 7.6 K/UL (ref 3.6–11)
WBC # BLD AUTO: 7.7 THOUSAND/UL (ref 3.8–10.8)

## 2021-02-23 PROCEDURE — 36415 COLL VENOUS BLD VENIPUNCTURE: CPT

## 2021-02-23 PROCEDURE — 86901 BLOOD TYPING SEROLOGIC RH(D): CPT

## 2021-02-23 PROCEDURE — 81025 URINE PREGNANCY TEST: CPT

## 2021-02-23 PROCEDURE — 85025 COMPLETE CBC W/AUTO DIFF WBC: CPT

## 2021-02-23 RX ORDER — LANOLIN ALCOHOL/MO/W.PET/CERES
1000 CREAM (GRAM) TOPICAL DAILY
COMMUNITY
End: 2022-05-25

## 2021-02-23 RX ORDER — CYANOCOBALAMIN (VITAMIN B-12) 500 MCG
500 LOZENGE ORAL DAILY
COMMUNITY
End: 2022-05-25

## 2021-02-23 RX ORDER — PREGABALIN 75 MG/1
75 CAPSULE ORAL 3 TIMES DAILY
COMMUNITY
End: 2022-05-25

## 2021-02-23 NOTE — PERIOP NOTES
[address] , [address] [facility]   MAIN OR                                  [phone]   MAIN PRE OP                          [phone]                                                                                AMBULATORY PRE OP          [phone]  PRE-ADMISSION TESTING    [phone]   Surgery Date:  Thursday 3/4/21      Is surgery arrival time given by surgeon? NO  If [facility] staff will call you Wednesday 3/3/21  between 3 and 7pm the day before your surgery with your arrival time. (If your surgery is on a Monday, we will call you the Friday before.)    Call [phone] after 7pm Monday-Friday if you did not receive this call. INSTRUCTIONS BEFORE YOUR SURGERY   When You  Arrive Arrive at the 2nd [address] [facility] on the day of your surgery  Have your insurance card, photo ID, and any copayment (if needed)   Food   and   Drink NO food or drink after midnight the night before surgery    This means NO water, gum, mints, coffee, juice, etc.  No alcohol (beer, wine, liquor) 24 hours before and after surgery   Medications to   TAKE   Morning of Surgery MEDICATIONS TO TAKE THE MORNING OF SURGERY WITH A SIP OF WATER:    None   Medications  To  STOP      7 days before surgery  Non-Steroidal anti-inflammatory Drugs (NSAID's): for example, Ibuprofen (Advil, Motrin), Naproxen (Aleve)   Aspirin, if taking for pain    Herbal supplements, vitamins, and fish oil     Blood  Thinners  If you take  Aspirin, Plavix, Coumadin, or any blood-thinning or anti-blood clot medicine, talk to the doctor who prescribed the medications for pre-operative instructions.  STOP Eliquis 2 days prior to day of surgery per Dr. Carbone Cough shower the morning of surgery, please do not apply anything to your skin (lotions, powders, deodorant, or makeup, especially mascara)   Follow Chlorhexidine Care Fusion body wash instructions provided to you during PAT appointment. Begin 3 days prior to surgery.  Do not shave or trim anywhere 24 hours before surgery   Wear your hair loose or down; no pony-tails, buns, or metal hair clips   Wear loose, comfortable, clean clothes   Wear glasses instead of contacts   Leave money, valuables, and jewelry, including body piercings, at home   Going Home - or Spending the Night  SAME-DAY SURGERY: You must have a responsible adult drive you home and stay with you 24 hours after surgery   ADMITS: If your doctor is keeping you in the hospital after surgery, leave personal belongings/luggage in your car until you have a hospital room number. Hospital discharge time is 12 noon  Drivers must be here before 12 noon unless you are told differently   Special Instructions · Bring PTA Medication list day of surgery with the last doses taken documented                     Do not bring medication bottles the day of surgery    It is now mandated that all surgical patients be tested for COVID-19 prior to surgery. Testing has to be exactly 4 days prior to surgery. Your COVID test date is Sunday 2/28/21  between 8:00 am and 11:00 am.       COVID testing will be performed curbside at the [facility]. There will be signs leading you to the testing site. You will need to bring a photo ID with you to be swabbed. Patients are advised to self-quarantine at home after testing and prior to your surgery date. You will be notified if your results are positive.     What to watch for:   Coronavirus (COVID-19) affects different people in different ways   It also appears with a wide range of symptoms from mild to severe   Signs usually appear 2-14 days after exposure     If you develop any of the following, notify your doctor immediately:  o Fever  o Chills, with or without a shiver  o Muscle pain  o Headache  o Sore throat  o Dry cough  o New loss of taste or smell  o Tiredness      If you develop any of the following, call 137:  o Shortness of breath  o Difficulty breathing  o Chest pain  o New confusion  o Blueness of fingers and/or lips         Follow all instructions so your surgery wont be cancelled. Please, be on time. If a situation occurs and you are delayed the day of surgery, call [phone] or [phone]. If your physical condition changes (like a fever, cold, flu, etc.) call your surgeon. Home medication(s) reviewed and verified via   LIST   VERBAL   during PAT appointment. The patient was contacted by    IN-PERSON  The patient verbalizes understanding of all instructions and     DOES NOT   need reinforcement.

## 2021-02-25 NOTE — PERIOP NOTES
Called Dr. Paloma Leon office to request return of Eliquis plan. Will return call to PAT with update. Received Eliquis plan. Patient called and instructed to stop Eliquis 2 days prior to surgery. Verbalized understanding.

## 2021-02-28 ENCOUNTER — HOSPITAL ENCOUNTER (OUTPATIENT)
Dept: PREADMISSION TESTING | Age: 46
Discharge: HOME | End: 2021-02-28
Payer: COMMERCIAL

## 2021-02-28 PROCEDURE — U0003 INFECTIOUS AGENT DETECTION BY NUCLEIC ACID (DNA OR RNA); SEVERE ACUTE RESPIRATORY SYNDROME CORONAVIRUS 2 (SARS-COV-2) (CORONAVIRUS DISEASE [COVID-19]), AMPLIFIED PROBE TECHNIQUE, MAKING USE OF HIGH THROUGHPUT TECHNOLOGIES AS DESCRIBED BY CMS-2020-01-R: HCPCS

## 2021-03-01 LAB — SARS-COV-2, COV2NT: NOT DETECTED

## 2021-03-03 ENCOUNTER — ANESTHESIA EVENT (OUTPATIENT)
Dept: SURGERY | Age: 46
End: 2021-03-03
Payer: COMMERCIAL

## 2021-03-03 NOTE — H&P
History and Physical    Pre-operative Evaluation / History & Physical   Sent From:  Sent To: [facility] [place] [facility]  [address] [facility], [address] [facility]   Phone: [phone] Fax: [phone]       Patient Information   Patient Name  Ketan Primus  Sex  F      1975  Age  50yo    Address  [address] [facility], [address]  Phone  H: [phone]  M: [phone]    Primary Insurance  [facility]  ID: [id number]  Group: [id number]  Policy Young: [address]  None recorded. Pre-Op Visit and Medical History   Chief Complaint  Pre-Op Exam    History of Present Illness   presents for pre-op exam. TLH BS Scheduled for 3/4/21. Endo Bx performed 2021   No complaints    Past Medical History  Past Medical History not reviewed (last reviewed 2021)  Fibromyalgia: Y  Other: N - Graves- in remission  Deep Venous Thrombosis: N - during pregnancy 2004 in leg- negative work up DVT in arm 10/30/19  Notes: Allergies-seasonal Asthma Depression DVT during pregnancy, work-up negative Graves -in remission    Surgical History  Surgical History not reviewed (last reviewed 2021)  Bilateral tubal ligation   Extraction of wisdom tooth   Obstetrical care - vaginal delivery   Sinus surgery procedure    Gynecological / Obstetrical History  Reviewed GYN History  Date of LMP: 2019 (Notes: none w ocps). Frequency of Cycle (Q days): 28. Duration of Flow (days): 20. Flow: Light (Notes: Large clots). Menses Monthly: N. Menstrual Cramps: mild (Notes: None). Date of Last Pap Smear: 2020 (Notes: 20- NIL, HPV (-) 19- Unsat 19- unsat 14-NIL). Date of HPV testin2020 (Notes: 20- negative 14-HPV NEG). HPV testing results: Negative. Abnormal Pap: N. Age at Menarche: 14. HPV Vaccine: N. Sexually Active?: Y. Sexual Problems?: N.  STIs/STDs: Y (Notes: Chlamydia).   Current Birth Control Method: Tubal Ligation. Date of Last Mammogram: (Notes: never). Endometriosis: N. Fibroids: N. Infertility: N. Ovarian Cyst: N. PCOS: N.    Social History  Social History not reviewed (last reviewed 01/28/2021)  OB/GYN Social History  Tobacco Smoking Status: Never smoker  Marital status:  (Notes: 17 years)  Sexual orientation?: Straight or heterosexual  Number of children: 1  Occupation: Homemaker  How often do you have a DRINK containing ALCOHOL?: Monthly or less  Sexual orientation : Straight or heterosexual    Family History  Family History not reviewed (last reviewed 01/28/2021)    Mother  - Heart disease  - dc'd      - Arteriosclerotic vascular disease    Allergies List  Allergies not reviewed (last reviewed 01/28/2021)      ASPIRIN: - Severity: Critical; Comment: Entered By: Sanya MURRYigned By: Dank Hilario: GPICode: [id number]: N;      HYDROXYCHLOROQUINE      NSAIDS (NON-STEROIDAL ANTI-INFLAMMATORY DRUG): - Severity: Critical; Comment: Entered By: Sanya MURRYigned By: Sanya LANDRYype: GPICode: [id number]: N;      QUININE     Medications  Medications not reviewed (last reviewed 01/28/2021)      Name  Date  Source     albuterol sulfate 2.5 mg/3 mL (0.083 %) solution for nebulization   Internal Note: Entered By: Tony Linda By: Alethea Cough: NBMN: N, start 08/07/2007 08/07/07 started  candido. [id number]     cannabidiol (CBD) oral oil   Take by oral route. Internal Note: 30 mg capsules twice daily  01/28/21 entered  Mohini Harding     cholecalciferol (vitamin D3) 125 mcg (5,000 unit) capsule   Internal Note: Entered By: Shravan Aguirre MA - Team 2 MECSigned By: Jenny Swanson MDUncoded: NBMN: N, start 11/13/2014 11/13/14 started  candido. [id number]     Eliquis 5 mg tablet   TAKE 1 TABLET BY MOUTH TWICE A DAY  12/16/20 filled  surescripts     norethindrone acetate 5 mg tablet   TAKE 1 TABLET BY MOUTH THREE TIMES A DAY  01/08/21 filled  surescripts     pregabalin 50 mg capsule   TAKE 1 CAPSULE BY MOUTH 3 TIMES A DAY  01/04/21 filled  surescripts     pregabalin 75 mg capsule   TAKE 1 CAPSULE BY MOUTH TWICE A DAY  01/22/21 filled  surescripts    Review of Systems  Additionally reports: Except as noted in the HPI, the review of systems is negative for General, Breast, , Resp, GI, CV, Endo, MS, Psych and Heme. Vital Signs  None recorded    Physical Exam  Patient is a 48-year-old female. Constitutional: General Appearance: well developed and nourished. Level of Distress: no acute distress. Eyes: Eyes extraocular movements intact and sclera anicteric. Ears, Nose, Throat: Ears normal hearing. Nose: no nasal discharge. Neck: Appearance full range of motion. Lungs / Chest: Respiratory effort: unlabored. Cardiovascular: Rate And Rhythm: regular. Extremities: no edema. Extremities: Extremities: no swelling or inflammation of the extremities. Skin: General Skin warm, dry, no obvious lesions. Neurologic: Gait and Station: normal gait. Mental Status Exam: Orientation alert and oriented. Affect: appropriate affect. Mood: appropriate mood. Language and Speech: normal speech and comprehension. Lab Results     Assessment and Plan  1. Menorrhagia -   AUB-coagulopathy on eloquis   Hgb improved s/p iron infusions   To send in Hgb this week   TSH normal per hematologist   EMBx negative x2   US without anatomic abnormalities   S/p megace with side effects   On norethindrone 10 mg daily   TT 6lbs   PSHx BTL   Pap NILM/HPV neg 1/21/2020   Eloquis: stop 48h prior and reinitate 5h postoperative   Discussed r/b/a. Discussed risks of bleeding, infection, injury to surrounding tissue, nerve damage, ICU admission and death. TO keep ovaries in place unless abnormal.   Surgery: TLH/BS   No NSAIDs, no celecoxib. Okay oxycodone   Consents signed today.    N92.0: Excessive and frequent menstruation with regular cycle     Return to [employer] Demi Luis MD for Surgery at [facility] (OP) on 03/04/2021 at 12:00 PM   Shadia Camacho MD for Post Op Exam at [facility] Office on 03/19/2021 at 11:00 AM    Current Problems (Diagnoses)  Problems not reviewed (last reviewed 01/28/2021)  Break-through bleeding - Onset: 10/03/2016 - Entered By: Nelson Weber MDSigned By: Nelson Weber MD Description: Break through bleeding code: 80.9   Malaise and fatigue - Onset: 08/07/2007 - Entered By: Tyler Higgins MDSigned By: Tyler Higgins MD Description: MALAISE AND FATIGUE code: 780.79     [facility] with hospital assist          Electronically Signed by: Michelle Lopez MD    _____________________________________________   Ordered/Documented by:   Visit Date: 02/16/2021

## 2021-03-04 ENCOUNTER — ANESTHESIA (OUTPATIENT)
Dept: SURGERY | Age: 46
End: 2021-03-04
Payer: COMMERCIAL

## 2021-03-04 ENCOUNTER — HOSPITAL ENCOUNTER (OUTPATIENT)
Age: 46
Setting detail: OUTPATIENT SURGERY
Discharge: HOME | End: 2021-03-04
Attending: OBSTETRICS & GYNECOLOGY | Admitting: OBSTETRICS & GYNECOLOGY
Payer: COMMERCIAL

## 2021-03-04 VITALS
HEART RATE: 68 BPM | HEIGHT: 62 IN | WEIGHT: 178.79 LBS | OXYGEN SATURATION: 95 % | DIASTOLIC BLOOD PRESSURE: 74 MMHG | TEMPERATURE: 98.2 F | RESPIRATION RATE: 15 BRPM | SYSTOLIC BLOOD PRESSURE: 120 MMHG | BODY MASS INDEX: 32.9 KG/M2

## 2021-03-04 DIAGNOSIS — Z90.710 S/P HYSTERECTOMY: Primary | ICD-10-CM

## 2021-03-04 PROCEDURE — 77030039147 HC PWDR HEMSTS SURGICEL JNJ -D: Performed by: OBSTETRICS & GYNECOLOGY

## 2021-03-04 PROCEDURE — 77030035029 HC NDL INSUF VERES DISP COVD -B: Performed by: OBSTETRICS & GYNECOLOGY

## 2021-03-04 PROCEDURE — 77030002933 HC SUT MCRYL J&J -A: Performed by: OBSTETRICS & GYNECOLOGY

## 2021-03-04 PROCEDURE — 77030019908 HC STETH ESOPH SIMS -A: Performed by: ANESTHESIOLOGY

## 2021-03-04 PROCEDURE — 77030008756 HC TU IRR SUC STRY -B: Performed by: OBSTETRICS & GYNECOLOGY

## 2021-03-04 PROCEDURE — 77030018778 HC MANIP UTER VCAR CNMD -B: Performed by: OBSTETRICS & GYNECOLOGY

## 2021-03-04 PROCEDURE — 77030018684: Performed by: OBSTETRICS & GYNECOLOGY

## 2021-03-04 PROCEDURE — 74011250636 HC RX REV CODE- 250/636: Performed by: ANESTHESIOLOGY

## 2021-03-04 PROCEDURE — 77030020263 HC SOL INJ SOD CL0.9% LFCR 1000ML: Performed by: OBSTETRICS & GYNECOLOGY

## 2021-03-04 PROCEDURE — 76210000016 HC OR PH I REC 1 TO 1.5 HR: Performed by: OBSTETRICS & GYNECOLOGY

## 2021-03-04 PROCEDURE — 74011000250 HC RX REV CODE- 250: Performed by: OBSTETRICS & GYNECOLOGY

## 2021-03-04 PROCEDURE — 74011250636 HC RX REV CODE- 250/636: Performed by: OBSTETRICS & GYNECOLOGY

## 2021-03-04 PROCEDURE — 77030018673: Performed by: OBSTETRICS & GYNECOLOGY

## 2021-03-04 PROCEDURE — 77030016151 HC PROTCTR LNS DFOG COVD -B: Performed by: OBSTETRICS & GYNECOLOGY

## 2021-03-04 PROCEDURE — 77030020061 HC IV BLD WRMR ADMIN SET 3M -B: Performed by: ANESTHESIOLOGY

## 2021-03-04 PROCEDURE — 77030014090 HC TRCR OPT AMR -B: Performed by: OBSTETRICS & GYNECOLOGY

## 2021-03-04 PROCEDURE — 77030037032 HC INSRT SCIS CLICKLLINE DISP STOR -B: Performed by: OBSTETRICS & GYNECOLOGY

## 2021-03-04 PROCEDURE — 51798 US URINE CAPACITY MEASURE: CPT

## 2021-03-04 PROCEDURE — 77030022704 HC SUT VLOC COVD -B: Performed by: OBSTETRICS & GYNECOLOGY

## 2021-03-04 PROCEDURE — 74011000250 HC RX REV CODE- 250: Performed by: ANESTHESIOLOGY

## 2021-03-04 PROCEDURE — 2709999900 HC NON-CHARGEABLE SUPPLY: Performed by: OBSTETRICS & GYNECOLOGY

## 2021-03-04 PROCEDURE — 76010000132 HC OR TIME 2.5 TO 3 HR: Performed by: OBSTETRICS & GYNECOLOGY

## 2021-03-04 PROCEDURE — 76060000036 HC ANESTHESIA 2.5 TO 3 HR: Performed by: OBSTETRICS & GYNECOLOGY

## 2021-03-04 PROCEDURE — 77030041236 HC APPL SURG ENDO JNJ -B: Performed by: OBSTETRICS & GYNECOLOGY

## 2021-03-04 PROCEDURE — 77030008684 HC TU ET CUF COVD -B: Performed by: ANESTHESIOLOGY

## 2021-03-04 PROCEDURE — 77030013079 HC BLNKT BAIR HGGR 3M -A: Performed by: ANESTHESIOLOGY

## 2021-03-04 PROCEDURE — 77030026438 HC STYL ET INTUB CARD -A: Performed by: ANESTHESIOLOGY

## 2021-03-04 PROCEDURE — 88307 TISSUE EXAM BY PATHOLOGIST: CPT

## 2021-03-04 PROCEDURE — 76210000025 HC REC RM PH II 3 TO 3.5 HR: Performed by: OBSTETRICS & GYNECOLOGY

## 2021-03-04 PROCEDURE — 77030034628 HC LIGASURE LAP SEAL DIV MD COVD -F: Performed by: OBSTETRICS & GYNECOLOGY

## 2021-03-04 PROCEDURE — 74011250637 HC RX REV CODE- 250/637: Performed by: OBSTETRICS & GYNECOLOGY

## 2021-03-04 PROCEDURE — 77030005513 HC CATH URETH FOL11 MDII -B: Performed by: OBSTETRICS & GYNECOLOGY

## 2021-03-04 RX ORDER — METRONIDAZOLE 500 MG/100ML
500 INJECTION, SOLUTION INTRAVENOUS ONCE
Status: COMPLETED | OUTPATIENT
Start: 2021-03-04 | End: 2021-03-04

## 2021-03-04 RX ORDER — ONDANSETRON 2 MG/ML
4 INJECTION INTRAMUSCULAR; INTRAVENOUS AS NEEDED
Status: DISCONTINUED | OUTPATIENT
Start: 2021-03-04 | End: 2021-03-04 | Stop reason: HOSPADM

## 2021-03-04 RX ORDER — PROPOFOL 10 MG/ML
INJECTION, EMULSION INTRAVENOUS AS NEEDED
Status: DISCONTINUED | OUTPATIENT
Start: 2021-03-04 | End: 2021-03-04 | Stop reason: HOSPADM

## 2021-03-04 RX ORDER — HEPARIN SODIUM 5000 [USP'U]/ML
5000 INJECTION, SOLUTION INTRAVENOUS; SUBCUTANEOUS ONCE
Status: COMPLETED | OUTPATIENT
Start: 2021-03-04 | End: 2021-03-04

## 2021-03-04 RX ORDER — HYDROMORPHONE HYDROCHLORIDE 1 MG/ML
.25-1 INJECTION, SOLUTION INTRAMUSCULAR; INTRAVENOUS; SUBCUTANEOUS
Status: DISCONTINUED | OUTPATIENT
Start: 2021-03-04 | End: 2021-03-04 | Stop reason: HOSPADM

## 2021-03-04 RX ORDER — OXYCODONE HYDROCHLORIDE 5 MG/1
5 TABLET ORAL
Status: COMPLETED | OUTPATIENT
Start: 2021-03-04 | End: 2021-03-04

## 2021-03-04 RX ORDER — SODIUM CHLORIDE, SODIUM LACTATE, POTASSIUM CHLORIDE, CALCIUM CHLORIDE 600; 310; 30; 20 MG/100ML; MG/100ML; MG/100ML; MG/100ML
125 INJECTION, SOLUTION INTRAVENOUS CONTINUOUS
Status: DISCONTINUED | OUTPATIENT
Start: 2021-03-04 | End: 2021-03-04 | Stop reason: HOSPADM

## 2021-03-04 RX ORDER — LIDOCAINE HYDROCHLORIDE 20 MG/ML
INJECTION, SOLUTION EPIDURAL; INFILTRATION; INTRACAUDAL; PERINEURAL AS NEEDED
Status: DISCONTINUED | OUTPATIENT
Start: 2021-03-04 | End: 2021-03-04 | Stop reason: HOSPADM

## 2021-03-04 RX ORDER — SODIUM CHLORIDE 0.9 % (FLUSH) 0.9 %
5-40 SYRINGE (ML) INJECTION EVERY 8 HOURS
Status: DISCONTINUED | OUTPATIENT
Start: 2021-03-04 | End: 2021-03-04 | Stop reason: HOSPADM

## 2021-03-04 RX ORDER — DIPHENHYDRAMINE HYDROCHLORIDE 50 MG/ML
12.5 INJECTION, SOLUTION INTRAMUSCULAR; INTRAVENOUS AS NEEDED
Status: DISCONTINUED | OUTPATIENT
Start: 2021-03-04 | End: 2021-03-04 | Stop reason: HOSPADM

## 2021-03-04 RX ORDER — SODIUM CHLORIDE 0.9 % (FLUSH) 0.9 %
5-40 SYRINGE (ML) INJECTION AS NEEDED
Status: DISCONTINUED | OUTPATIENT
Start: 2021-03-04 | End: 2021-03-04 | Stop reason: HOSPADM

## 2021-03-04 RX ORDER — NEOSTIGMINE METHYLSULFATE 1 MG/ML
INJECTION, SOLUTION INTRAVENOUS AS NEEDED
Status: DISCONTINUED | OUTPATIENT
Start: 2021-03-04 | End: 2021-03-04 | Stop reason: HOSPADM

## 2021-03-04 RX ORDER — FLUMAZENIL 0.1 MG/ML
0.2 INJECTION INTRAVENOUS
Status: DISCONTINUED | OUTPATIENT
Start: 2021-03-04 | End: 2021-03-04 | Stop reason: HOSPADM

## 2021-03-04 RX ORDER — PROPOFOL 10 MG/ML
INJECTION, EMULSION INTRAVENOUS
Status: DISCONTINUED | OUTPATIENT
Start: 2021-03-04 | End: 2021-03-04 | Stop reason: HOSPADM

## 2021-03-04 RX ORDER — FENTANYL CITRATE 50 UG/ML
INJECTION, SOLUTION INTRAMUSCULAR; INTRAVENOUS AS NEEDED
Status: DISCONTINUED | OUTPATIENT
Start: 2021-03-04 | End: 2021-03-04 | Stop reason: HOSPADM

## 2021-03-04 RX ORDER — ONDANSETRON 2 MG/ML
INJECTION INTRAMUSCULAR; INTRAVENOUS AS NEEDED
Status: DISCONTINUED | OUTPATIENT
Start: 2021-03-04 | End: 2021-03-04 | Stop reason: HOSPADM

## 2021-03-04 RX ORDER — ACETAMINOPHEN 500 MG
1000 TABLET ORAL ONCE
Status: COMPLETED | OUTPATIENT
Start: 2021-03-04 | End: 2021-03-04

## 2021-03-04 RX ORDER — NALOXONE HYDROCHLORIDE 0.4 MG/ML
0.04 INJECTION, SOLUTION INTRAMUSCULAR; INTRAVENOUS; SUBCUTANEOUS
Status: DISCONTINUED | OUTPATIENT
Start: 2021-03-04 | End: 2021-03-04 | Stop reason: HOSPADM

## 2021-03-04 RX ORDER — PREGABALIN 75 MG/1
150 CAPSULE ORAL ONCE
Status: COMPLETED | OUTPATIENT
Start: 2021-03-04 | End: 2021-03-04

## 2021-03-04 RX ORDER — FENTANYL CITRATE 50 UG/ML
25 INJECTION, SOLUTION INTRAMUSCULAR; INTRAVENOUS
Status: DISCONTINUED | OUTPATIENT
Start: 2021-03-04 | End: 2021-03-04 | Stop reason: HOSPADM

## 2021-03-04 RX ORDER — ALBUTEROL SULFATE 0.83 MG/ML
2.5 SOLUTION RESPIRATORY (INHALATION) AS NEEDED
Status: DISCONTINUED | OUTPATIENT
Start: 2021-03-04 | End: 2021-03-04 | Stop reason: HOSPADM

## 2021-03-04 RX ORDER — METOCLOPRAMIDE HYDROCHLORIDE 5 MG/ML
INJECTION INTRAMUSCULAR; INTRAVENOUS AS NEEDED
Status: DISCONTINUED | OUTPATIENT
Start: 2021-03-04 | End: 2021-03-04 | Stop reason: HOSPADM

## 2021-03-04 RX ORDER — BUPIVACAINE HYDROCHLORIDE 5 MG/ML
INJECTION, SOLUTION EPIDURAL; INTRACAUDAL AS NEEDED
Status: DISCONTINUED | OUTPATIENT
Start: 2021-03-04 | End: 2021-03-04 | Stop reason: HOSPADM

## 2021-03-04 RX ORDER — FAMOTIDINE 10 MG/ML
INJECTION INTRAVENOUS AS NEEDED
Status: DISCONTINUED | OUTPATIENT
Start: 2021-03-04 | End: 2021-03-04 | Stop reason: HOSPADM

## 2021-03-04 RX ORDER — LIDOCAINE HYDROCHLORIDE 10 MG/ML
0.1 INJECTION, SOLUTION EPIDURAL; INFILTRATION; INTRACAUDAL; PERINEURAL AS NEEDED
Status: DISCONTINUED | OUTPATIENT
Start: 2021-03-04 | End: 2021-03-04 | Stop reason: HOSPADM

## 2021-03-04 RX ORDER — ROCURONIUM BROMIDE 10 MG/ML
INJECTION, SOLUTION INTRAVENOUS AS NEEDED
Status: DISCONTINUED | OUTPATIENT
Start: 2021-03-04 | End: 2021-03-04 | Stop reason: HOSPADM

## 2021-03-04 RX ORDER — OXYCODONE HYDROCHLORIDE 5 MG/1
5 TABLET ORAL
Qty: 18 TAB | Refills: 0 | Status: SHIPPED | OUTPATIENT
Start: 2021-03-04 | End: 2021-03-09

## 2021-03-04 RX ORDER — SUCCINYLCHOLINE CHLORIDE 20 MG/ML
INJECTION INTRAMUSCULAR; INTRAVENOUS AS NEEDED
Status: DISCONTINUED | OUTPATIENT
Start: 2021-03-04 | End: 2021-03-04 | Stop reason: HOSPADM

## 2021-03-04 RX ORDER — SODIUM CHLORIDE, SODIUM LACTATE, POTASSIUM CHLORIDE, CALCIUM CHLORIDE 600; 310; 30; 20 MG/100ML; MG/100ML; MG/100ML; MG/100ML
INJECTION, SOLUTION INTRAVENOUS
Status: DISCONTINUED | OUTPATIENT
Start: 2021-03-04 | End: 2021-03-04 | Stop reason: HOSPADM

## 2021-03-04 RX ORDER — DEXAMETHASONE SODIUM PHOSPHATE 4 MG/ML
INJECTION, SOLUTION INTRA-ARTICULAR; INTRALESIONAL; INTRAMUSCULAR; INTRAVENOUS; SOFT TISSUE AS NEEDED
Status: DISCONTINUED | OUTPATIENT
Start: 2021-03-04 | End: 2021-03-04 | Stop reason: HOSPADM

## 2021-03-04 RX ORDER — GLYCOPYRROLATE 0.2 MG/ML
INJECTION INTRAMUSCULAR; INTRAVENOUS AS NEEDED
Status: DISCONTINUED | OUTPATIENT
Start: 2021-03-04 | End: 2021-03-04 | Stop reason: HOSPADM

## 2021-03-04 RX ORDER — MIDAZOLAM HYDROCHLORIDE 1 MG/ML
INJECTION, SOLUTION INTRAMUSCULAR; INTRAVENOUS AS NEEDED
Status: DISCONTINUED | OUTPATIENT
Start: 2021-03-04 | End: 2021-03-04 | Stop reason: HOSPADM

## 2021-03-04 RX ADMIN — OXYCODONE 5 MG: 5 TABLET ORAL at 13:45

## 2021-03-04 RX ADMIN — FENTANYL CITRATE 50 MCG: 0.05 INJECTION, SOLUTION INTRAMUSCULAR; INTRAVENOUS at 09:59

## 2021-03-04 RX ADMIN — SODIUM CHLORIDE, POTASSIUM CHLORIDE, SODIUM LACTATE AND CALCIUM CHLORIDE 125 ML/HR: 600; 310; 30; 20 INJECTION, SOLUTION INTRAVENOUS at 07:34

## 2021-03-04 RX ADMIN — ONDANSETRON HYDROCHLORIDE 4 MG: 2 SOLUTION INTRAMUSCULAR; INTRAVENOUS at 12:00

## 2021-03-04 RX ADMIN — HEPARIN SODIUM 5000 UNITS: 5000 INJECTION INTRAVENOUS; SUBCUTANEOUS at 07:43

## 2021-03-04 RX ADMIN — GLYCOPYRROLATE 0.2 MG: 0.2 INJECTION INTRAMUSCULAR; INTRAVENOUS at 12:00

## 2021-03-04 RX ADMIN — METOCLOPRAMIDE 10 MG: 5 INJECTION, SOLUTION INTRAMUSCULAR; INTRAVENOUS at 10:22

## 2021-03-04 RX ADMIN — CEFAZOLIN SODIUM 2 G: 1 POWDER, FOR SOLUTION INTRAMUSCULAR; INTRAVENOUS at 09:45

## 2021-03-04 RX ADMIN — PROPOFOL 90 MG: 10 INJECTION, EMULSION INTRAVENOUS at 09:39

## 2021-03-04 RX ADMIN — SUCCINYLCHOLINE CHLORIDE 100 MG: 20 INJECTION, SOLUTION INTRAMUSCULAR; INTRAVENOUS at 09:39

## 2021-03-04 RX ADMIN — PROPOFOL 20 MG: 10 INJECTION, EMULSION INTRAVENOUS at 09:42

## 2021-03-04 RX ADMIN — SODIUM CHLORIDE, POTASSIUM CHLORIDE, SODIUM LACTATE AND CALCIUM CHLORIDE: 600; 310; 30; 20 INJECTION, SOLUTION INTRAVENOUS at 09:48

## 2021-03-04 RX ADMIN — MIDAZOLAM HYDROCHLORIDE 2 MG: 1 INJECTION, SOLUTION INTRAMUSCULAR; INTRAVENOUS at 09:32

## 2021-03-04 RX ADMIN — FAMOTIDINE 20 MG: 10 INJECTION INTRAVENOUS at 10:18

## 2021-03-04 RX ADMIN — PROPOFOL 50 MCG/KG/MIN: 10 INJECTION, EMULSION INTRAVENOUS at 09:52

## 2021-03-04 RX ADMIN — LIDOCAINE HYDROCHLORIDE 30 MG: 20 INJECTION, SOLUTION EPIDURAL; INFILTRATION; INTRACAUDAL; PERINEURAL at 09:38

## 2021-03-04 RX ADMIN — Medication 2 MG: at 12:00

## 2021-03-04 RX ADMIN — FENTANYL CITRATE 50 MCG: 0.05 INJECTION, SOLUTION INTRAMUSCULAR; INTRAVENOUS at 10:03

## 2021-03-04 RX ADMIN — FENTANYL CITRATE 50 MCG: 0.05 INJECTION, SOLUTION INTRAMUSCULAR; INTRAVENOUS at 09:37

## 2021-03-04 RX ADMIN — ROCURONIUM BROMIDE 20 MG: 10 INJECTION INTRAVENOUS at 10:44

## 2021-03-04 RX ADMIN — FENTANYL CITRATE 50 MCG: 0.05 INJECTION, SOLUTION INTRAMUSCULAR; INTRAVENOUS at 11:11

## 2021-03-04 RX ADMIN — ROCURONIUM BROMIDE 40 MG: 10 INJECTION INTRAVENOUS at 09:45

## 2021-03-04 RX ADMIN — FENTANYL CITRATE 50 MCG: 0.05 INJECTION, SOLUTION INTRAMUSCULAR; INTRAVENOUS at 12:00

## 2021-03-04 RX ADMIN — ACETAMINOPHEN 1000 MG: 500 TABLET ORAL at 07:41

## 2021-03-04 RX ADMIN — PREGABALIN 150 MG: 75 CAPSULE ORAL at 07:41

## 2021-03-04 RX ADMIN — ROCURONIUM BROMIDE 10 MG: 10 INJECTION INTRAVENOUS at 09:38

## 2021-03-04 RX ADMIN — MEPERIDINE HYDROCHLORIDE 12.5 MG: 25 INJECTION INTRAMUSCULAR; INTRAVENOUS; SUBCUTANEOUS at 12:38

## 2021-03-04 RX ADMIN — FENTANYL CITRATE 50 MCG: 0.05 INJECTION, SOLUTION INTRAMUSCULAR; INTRAVENOUS at 09:41

## 2021-03-04 RX ADMIN — MIDAZOLAM HYDROCHLORIDE 3 MG: 1 INJECTION, SOLUTION INTRAMUSCULAR; INTRAVENOUS at 09:29

## 2021-03-04 RX ADMIN — METRONIDAZOLE 500 MG: 500 SOLUTION INTRAVENOUS at 09:53

## 2021-03-04 RX ADMIN — FENTANYL CITRATE 25 MCG: 0.05 INJECTION, SOLUTION INTRAMUSCULAR; INTRAVENOUS at 12:02

## 2021-03-04 RX ADMIN — DEXAMETHASONE SODIUM PHOSPHATE 4 MG: 4 INJECTION, SOLUTION INTRAMUSCULAR; INTRAVENOUS at 10:14

## 2021-03-04 NOTE — OP NOTES
Operative Note    Patient ID:   Name: Matt Record Number: [id number]    YOB: 1975    Preoperative Diagnosis: MENORRHAGIA due to anticoagulation    Postoperative Diagnosis: same as above      Surgeon:  Peter Quezada MD     Assistant:  Myriam Alicia    Anesthesia: General    Procedure: Total laparoscopic hysterectomy, bilateral salpingectomy, cystoscopy. Uterus <250g    Findings: Small anteverted uterus with multiparous cervix. Uterus nonenlarged without fibroids. Normal bilateral fallopian tubes, appendix, ovaries. Normal bowel without defects upon entry. Increased bleeding noted during skin incisions and on colpotomy with vaginal perforators. Hemostasis excellent with vaginal cuff suture placement, no bleeding with pressure decreased to 6mmHg. Surgicel powder placed. Vaginal cuff palpated intact vaginally. No vaginal lacerations. Bilateral ureters identified throughout the hysterectomy, vermiculating. Cystoscopy with no defects in the bladder, no sutures seen and bilateral efflux from ureteral orifices. Estimated Blood Loss: 100cc    Drains: none    Pathology /Specimens:     ID Type Source Tests Collected by Time Destination   1 : Uterus, Cervix, and bilateral fallopian tube Preservative Uterus with Bilateral Fallopian Tubes  Farheen Andrade MD 3/4/2021 1125 Pathology       DVT Prophylaxis: SCD Hose    Antibiotic Prophylaxis: Ancef and flagyl 500mg    DVT PPx: compression devices on, 5000U SQ heparin preoperatively        PROCEDURE IN DETAIL: The patient was taken to the operating room and placed on the OR  table and given general anesthesia. She was then placed in lithotomy position and  prepped and draped in a sterile fashion. Then, a time-out was performed. Cortez was placed followed by a sterile speculum that was inserted into the vagina. A  single-tooth tenaculum was used to grasp the anterior lip of the cervix and the uterus  was sounded to 7 cm.  A medium VCare uterine manipulator was then placed and the balloon  was inflated. Attention was then turned to the abdomen. Local infiltration with anesthetic 0.25% marcaine at the umbilicus followed bu  A vertical 5mm incision. Next a Veress was used to obtain abdominal  entry and insufflation was performed, opening pressure <10mmHg. A 5-mm port was placed using a Visiport confirming  abdominal entry. After local infiltration with anesthesia,  bilateral 5 mm lower quadrant ports with an additional right lateral 5mm port were then placed and diagnostic laparoscopy was performed with the above findings noted. Blunt graspers and LigaSure  were used to dissect bilateral fallopian tubes away from the mesosalpinx. Fallopian tubes were removed. Bilateral ureters identified, vermiculating. Then the utero-  ovarian artery was burned and ligated followed by dissection of the round ligament with  the LigaSure device. This was repeated on the other side. The anterior leaf of the  broad was then dissected and a bladder flap was created. The posterior leaf of the broad was then dissected. Bilateral uterine arteries were  then skeletonized. These were cauterized multiple times and then transected. Parallel  bites of the cardinal ligaments were then made to further lateralize the pedicle and this  was serially done until the [facility] manipulator colpotomy cup was palpated. The bladder flap was dissected a  small amount further to ensure that it was well away from the [facility] manipulator and all  of the bladder pillars. The Monopolar spatula was then used to amputate the specimen using  the VCare device as a landmark. The specimen was then completely amputated and the uterus and cervix were  removed from the vagina and packing was placed. Several vaginal perforators at the colpotomy site were oozing.  Hemostasis was achieved with the midline bleeding vessels with the ligasure, however the lateral perforators were left oozing to be incorporated in the vaginal closure stitch. Next, a needle  was backloaded with the 5mm port and introduced the two  2-0 v lock suture into the abdomen. Laparoscopic needle drivers were used to close the vaginal cuff closed with 2-0 v lock 6\" x2 in a running fashion with excellent hemostasis. Both sutures started from each apex. The uterosacral ligaments had been tagged. Irrigation performed with excellent hemostasis. Pressure was decreased to 6mmHg without any evidence of bleeding. Bilateral ureters identified. Surgicel was placed on all the pedicles and the vaginal cuff. At this time, the Cortez was  removed and backfilled with fluid. Cystoscopy performed. The bladder was visualized and brisk  efflux from bilateral ureteral orifices were visualized. The bladder was then examined  in its entirety with no abnormalities noted. Air was then removed from the abdomen. The port sites were then  removed. The ports were closed with 4-0 Monocryl in subcuticular fashion and steris  was placed followed by bandaids. Her legs were then taken out of lithotomy position and she tolerated the  procedure well. She was awoken from anesthesia and taken to the PACU in stable  condition.        Signed By: Bry Roes MD

## 2021-03-04 NOTE — INTERVAL H&P NOTE
Update History & Physical 
 
The Patient's History and Physical of February 16, 2021 was reviewed with the patient and I examined the patient. There was no change. The surgical site was confirmed by the patient and me. Plan:  The risk, benefits, expected outcome, and alternative to the recommended procedure have been discussed with the patient. Patient understands and wants to proceed with the procedure. The patient was counseled at length about the risks of kelly Covid-19 during their perioperative period and any recovery window from their procedure. The patient was made aware that kelly Covid-19  may worsen their prognosis for recovering from their procedure and lend to a higher morbidity and/or mortality risk. All material risks, benefits, and reasonable alternatives including postponing the procedure were discussed. The patient does  wish to proceed with the procedure at this time.  
 
 
 
Electronically signed by Jose D Gilmore MD on 3/4/2021 at 8:56 AM

## 2021-03-04 NOTE — ANESTHESIA POSTPROCEDURE EVALUATION
Procedure(s):  TOTAL LAPAROSCOPIC HYSTERECTOMY AND BILATERAL SALPINGECTOMY. general    Anesthesia Post Evaluation      Multimodal analgesia: multimodal analgesia not used between 6 hours prior to anesthesia start to PACU discharge  Patient location during evaluation: PACU  Patient participation: complete - patient participated  Level of consciousness: awake  Pain management: adequate  Airway patency: patent  Anesthetic complications: no  Cardiovascular status: acceptable, blood pressure returned to baseline and hemodynamically stable  Respiratory status: acceptable  Hydration status: acceptable  Post anesthesia nausea and vomiting:  controlled  Final Post Anesthesia Temperature Assessment:  Normothermia (36.0-37.5 degrees C)      INITIAL Post-op Vital signs:   Vitals Value Taken Time   /70 03/04/21 1315   Temp 36.8 °C (98.3 °F) 03/04/21 1227   Pulse 79 03/04/21 1320   Resp 16 03/04/21 1320   SpO2 97 % 03/04/21 1320   Vitals shown include unvalidated device data.

## 2021-03-04 NOTE — ANESTHESIA PREPROCEDURE EVALUATION
Relevant Problems   RESPIRATORY SYSTEM   (+) Asthma      ENDOCRINE   (+) Arthritis      HEMATOLOGY   (+) Iron deficiency anemia due to chronic blood loss       Anesthetic History   No history of anesthetic complications       Comments: States she was told in the past she needs smaller ETT (remembers size 6)     Review of Systems / Medical History  Patient summary reviewed and pertinent labs reviewed    Pulmonary            Asthma (remote history, no inhaler use in years)        Neuro/Psych         Psychiatric history     Cardiovascular                  Exercise tolerance: >4 METS     GI/Hepatic/Renal  Within defined limits              Endo/Other        Arthritis  Pertinent negatives: Hypothyroidism: h/o Graves disease-resolved.    Other Findings   Comments: H/o DVT and clot in shoulder-on eliquis    Fibromyalgia     Neuropathy-hand, feet, face         Physical Exam    Airway  Mallampati: II  TM Distance: 4 - 6 cm  Neck ROM: normal range of motion   Mouth opening: Normal     Cardiovascular    Rhythm: regular  Rate: normal         Dental    Dentition: Upper dentition intact and Lower dentition intact     Pulmonary  Breath sounds clear to auscultation               Abdominal         Other Findings            Anesthetic Plan    ASA: 2  Anesthesia type: general          Induction: Intravenous  Anesthetic plan and risks discussed with: Patient

## 2021-03-04 NOTE — BRIEF OP NOTE
Brief Postoperative Note    Patient: Naa Carrizales  YOB: 1975  MRN: [id number]    Date of Procedure: 3/4/2021     Pre-Op Diagnosis: MENORRHAGIA    Post-Op Diagnosis: Same as preoperative diagnosis. Procedure(s):  TOTAL LAPAROSCOPIC HYSTERECTOMY AND BILATERAL SALPINGECTOMY, cystoscopy     Surgeon(s):  Aly Bush MD    Surgical Assistant: Surg Asst-1: Linda Alicia    Anesthesia: General     Estimated Blood Loss (mL): 720    Complications: None    Specimens:   ID Type Source Tests Collected by Time Destination   1 : Uterus, Cervix, and bilateral fallopian tube Preservative Uterus with Bilateral Fallopian Tubes  Aly Bush MD 3/4/2021 1125 Pathology        Implants: * No implants in log *    Drains: * No LDAs found *    Findings: Small anteverted uterus with multiparous cervix. Uterus nonenlarged without fibroids. Normal bilateral fallopian tubes, appendix, ovaries. Normal bowel without defects upon entry. Increased bleeding noted during skin incisions and on colpotomy with vaginal perforators. Hemostasis excellent with vaginal cuff suture placement, no bleeding with pressure decreased to 6mmHg. Surgicel powder placed. Vaginal cuff palpated intact vaginally. No vaginal lacerations. Bilateral ureters identified throughout the hysterectomy, vermiculating. Cystoscopy with no defects in the bladder, no sutures seen and bilateral efflux from ureteral orifices.       Electronically Signed by Tamar Bhagat MD on 3/4/2021 at 12:09 PM

## 2021-03-04 NOTE — PERIOP NOTES
1430 Pt taken to restroom to attempt to void. Unable to void at this time. 56 Dr Danielle Plaza notified pt unable to void. Order to bladder scan and straight cath if bladder is >200.    1550 Up to BR to attempt to void. No results at this time. 1630 Pt able to void in bathroom. Pt's  notified for pickup.

## 2021-03-04 NOTE — DISCHARGE INSTRUCTIONS
Laparoscopic Hysterectomy: What to Expect at [facility] 30 laparoscopic hysterectomy is surgery to take out the uterus. Your doctor put a lighted tube and surgical tools through small cuts in your belly to remove the uterus. You can expect to feel better and stronger each day. But you might need pain medicine for a week or two. You may get tired easily or have less energy than usual. The tiredness may last for several weeks after surgery. You will probably notice that your belly is swollen and puffy. This is common. The swelling will take several weeks to go down. You may take about 4 to 6 weeks to fully recover. It's important to avoid lifting while you are recovering so that you can heal.  This care sheet gives you a general idea about how long it will take for you to recover. But each person recovers at a different pace. Follow the steps below to get better as quickly as possible. How can you care for yourself at home? Activity    · Rest when you feel tired.     · Be active. Walking is a good choice.     · Allow the area to heal. Don't move quickly or lift anything heavy until you are feeling better.     · You may shower 24 to 48 hours after surgery, if your doctor okays it. Pat the incision dry. Do not take a bath for the first 2 weeks, or until your doctor tells you it is okay.     · Ask your doctor when it is okay for you to have sex. Diet    · You can eat your normal diet. If your stomach is upset, try bland, low-fat foods like plain rice, broiled chicken, toast, and yogurt.     · If your bowel movements are not regular right after surgery, try to avoid constipation and straining. Drink plenty of water. Your doctor may suggest fiber, a stool softener, or a mild laxative. Medicines    · Your doctor will tell you if and when you can restart your medicines.  He or she will also give you instructions about taking any new medicines.     · If you take aspirin or some other blood thinner, ask your doctor if and when to start taking it again. Make sure that you understand exactly what your doctor wants you to do.     · Be safe with medicines. Read and follow all instructions on the label. ? If the doctor gave you a prescription medicine for pain, take it as prescribed. ? If you are not taking a prescription pain medicine, ask your doctor if you can take an over-the-counter medicine.     · If your doctor prescribed antibiotics, take them as directed. Do not stop taking them just because you feel better. You need to take the full course of antibiotics. Incision care    · You may have stitches over the cuts (incisions) the doctor made in your belly.     · If you have strips of tape on the cut (incision) the doctor made, leave the tape on for a week or until it falls off.     · Wash the area daily with warm, soapy water, and pat it dry. Don't use hydrogen peroxide or alcohol. They can slow healing.     · You may cover the area with a gauze bandage if it oozes fluid or rubs against clothing.     · Change the bandage every day. Other instructions    · You may have some light vaginal bleeding. Wear sanitary pads if needed. Do not douche or use tampons.     · Don't have sex until the doctor says it is okay. Follow-up care is a key part of your treatment and safety. Be sure to make and go to all appointments, and call your doctor if you are having problems. It's also a good idea to know your test results and keep a list of the medicines you take. When should you call for help? Call 911 anytime you think you may need emergency care. For example, call if:    · You passed out (lost consciousness).     · You have chest pain, are short of breath, or cough up blood. Call your doctor now or seek immediate medical care if:    · You have pain that does not get better after you take pain medicine.     · You cannot pass stools or gas.     · You have vaginal discharge that has increased in amount or smells bad.   · You are sick to your stomach or cannot drink fluids.     · You have loose stitches, or your incision comes open.     · Bright red blood has soaked through the bandage over your incision.     · You have signs of infection, such as:  ? Increased pain, swelling, warmth, or redness. ? Red streaks leading from the incision. ? Pus draining from the incision. ? A fever.     · You have bright red vaginal bleeding that soaks one or more pads in an hour, or you have large clots.     · You have signs of a blood clot in your leg (called a deep vein thrombosis), such as:  ? Pain in your calf, back of the knee, thigh, or groin. ? Redness and swelling in your leg or groin. Watch closely for changes in your health, and be sure to contact your doctor if you have any problems. Where can you learn more? Go to http://www.gray.com/  Enter Q131 in the search box to learn more about \"Laparoscopic Hysterectomy: What to Expect at Home. \"  Current as of: November 8, 2019               Content Version: 12.6  © 8822-7568 [employer]. Care instructions adapted under license by [employer] (which disclaims liability or warranty for this information). If you have questions about a medical condition or this instruction, always ask your healthcare professional. Linnea Rai disclaims any warranty or liability for your use of this information.     Seda Santoro from your Nurse      PATIENT INSTRUCTIONS    After general anesthesia or intravenous sedation, for 24 hours or while taking prescription Narcotics:  · Limit your activities  · Do not drive and operate hazardous machinery  · Do not make important personal or business decisions  · Do  not drink alcoholic beverages  · If you have not urinated within 8 hours after discharge, please contact your surgeon on call.     Report the following to your surgeon:  · Excessive pain, swelling, redness or odor of or around the surgical area  · Temperature over 100.5  · Nausea and vomiting lasting longer than 4 hours or if unable to take medications  · Any signs of decreased circulation or nerve impairment to extremity: change in color, persistent  numbness, tingling, coldness or increase pain  · Any questions      GOOD HELP TO FIGHT AN INFECTION  Here are a few tip to help reduce the chance of getting an infection after surgery:   Wash Your Hands   Good handwashing is the most important thing you and your caregiver can do.  Wash before and after caring for any wounds. Dry your hand with a clean towel.  Wash with soap and water for at least 20 seconds. A TIP: sing the \"Happy Birthday\" song through one time while washing to help with the timing.  Use a hand  in between washings.  Shower   When your surgeon says it is OK to take a shower, use a new bar of antibacterial soap (if that is what you use, and keep that bar of soap ONLY for your use), or antibacterial body wash.  Use a clean wash cloth or sponge when you bathe.  Dry off with a clean towel  after every bath - be careful around any wounds, skin staples, sutures or surgical glue over/on wounds.  Do not enter swimming pools, hot tubs, lakes, rivers and/or ocean until wounds are healed and your doctor/surgeon says it is OK.  Use Clean Sheets   Sleep on freshly laundered sheets after your surgery.  Keep the surgery site covered with a clean, dry bandage (if instructed to do so). If the bandage becomes soiled, reapply a new, dry, clean bandage.  Do not allow pets to sleep with you while your wound is healing.  Lifestyle Modification and Controlling Your Blood Sugar   Smoking slows wound healing. Stop smoking and limit exposure to second-hand smoke.  High blood sugar slows wound healing.   Eat a well-balanced diet to provide proper nutrition while healing   Monitor your blood sugar (if you are a diabetic) and take your medications as you are suppose to so you can control you blood sugar after surgery. COUGH AND DEEP BREATHE    Breathing deeply and coughing are very important exercises to do after surgery. Deep breathing and coughing open the little air tubes and air sacks in your lungs. You take deep breaths every day. You may not even notice - it is just something you do when you sigh or yawn. It is a natural exercise you do to keep these air passages open. After surgery, take deep breaths and cough, on purpose. DIRECTIONS:  · Take 10 to 15 slow deep breaths every hour while awake. · Breathe in deeply, and hold it for 2 seconds. · Exhale slowly through puckered lips, like blowing up a balloon. · After every 4th or 5th deep breath, hug your pillow to your chest or belly and give a hard, deep cough. Yes, it will probably hurt. But doing this exercise is a very important part of healing after surgery. Take your pain medicine to help you do this exercise without too much pain. Coughing and deep breathing help prevent bronchitis and pneumonia after surgery. If you had chest or belly surgery, use a pillow as a \"hug migdalia\" and hold it tightly to your chest or belly when you cough. ANKLE PUMPS    Ankle pumps increase the circulation of oxygenated blood to your lower extremities and decrease your risk for circulation problems such as blood clots. They also stretch the muscles, tendons and ligaments in your foot and ankle, and prevent joint contracture in the ankle and foot, especially after surgeries on the legs. It is important to do ankle pump exercises regularly after surgery because immobility increases your risk for developing a blood clot. Your doctor may also have you take an Aspirin for the next few days as well. If your doctor did not ask you to take an Aspirin, consult with him before starting Aspirin therapy on your own. The exercise is quite simple.      · Slowly point your foot forward, feeling the muscles on the top of your lower leg stretch, and hold this position for 5 seconds. · Next, pull your foot back toward you as far as possible, stretching the calf muscles, and hold that position for 5 seconds. · Repeat with the other foot. · Perform 10 repetitions every hour while awake for both ankles if possible (down and then up with the foot once is one repetition). You should feel gentle stretching of the muscles in your lower leg when doing this exercise. If you feel pain, or your range of motion is limited, don't push too hard. Only go the limit your joint and muscles will let you go. If you have increasing pain, progressively worsening leg warmth or swelling, STOP the exercise and call your doctor. MEDICATION AND   SIDE EFFECT GUIDE    The [facility] MEDICATION AND SIDE EFFECT GUIDE was provided to the PATIENT AND CARE PROVIDER. Information provided includes instruction about drug purpose and common side effects for the following medications:   · Oxycodone  · colace        These are general instructions for a healthy lifestyle:    *   Please give a list of your current medications to your Primary Care Provider. *   Please update this list whenever your medications are discontinued, doses are changed, or new medications (including over-the-counter products) are added. *   Please carry medication information at all times in case of emergency situations. About Smoking  No smoking / No tobacco products  Avoid exposure to second hand smoke     Surgeon General's Warning:  Quitting smoking now greatly reduces serious risk to your health. Obesity, smoking, and sedentary lifestyle greatly increases your risk for illness and disease. A healthy diet, regular physical exercise & weight monitoring are important for maintaining a healthy lifestyle.       Congestive Heart Failure  You may be retaining fluid if you have a history of heart failure or if you experience any of the following symptoms:  Weight gain of 3 pounds or more overnight or 5 pounds in a week, increased swelling in your hands or feet or shortness of breath while lying flat in bed. Please call your doctor as soon as you notice any of these symptoms; do not wait until your next office visit. Learning About Coronavirus [phone])  Coronavirus [phone]): Overview  What is coronavirus (COVID-19)? The coronavirus disease (COVID-19) is caused by a virus. It is an illness that was first found in [place] in December 2019. It has since spread worldwide. The virus can cause fever, cough, and trouble breathing. In severe cases, it can cause pneumonia and make it hard to breathe without help. It can cause death. Coronaviruses are a large group of viruses. They cause the common cold. They also cause more serious illnesses like Middle East respiratory syndrome (MERS) and severe acute respiratory syndrome (SARS). COVID-19 is caused by a novel coronavirus. That means it's a new type that has not been seen in people before. This virus spreads person-to-person through droplets from coughing and sneezing. It can also spread when you are close to someone who is infected. And it can spread when you touch something that has the virus on it, such as a doorknob or a tabletop. What can you do to protect yourself from coronavirus (COVID-19)? The best way to protect yourself from getting sick is to:  · Avoid areas where there is an outbreak. · Avoid contact with people who may be infected. · Wash your hands often with soap or alcohol-based hand sanitizers. · Avoid crowds and try to stay at least 6 feet away from other people. · Wash your hands often, especially after you cough or sneeze. Use soap and water, and scrub for at least 20 seconds. If soap and water aren't available, use an alcohol-based hand . · Avoid touching your mouth, nose, and eyes.   What can you do to avoid spreading the virus to others? To help avoid spreading the virus to others:  · Cover your mouth with a tissue when you cough or sneeze. Then throw the tissue in the trash. · Use a disinfectant to clean things that you touch often. · Stay home if you are sick or have been exposed to the virus. Don't go to school, work, or public areas. And don't use public transportation. · If you are sick:  ? Leave your home only if you need to get medical care. But call the doctor's office first so they know you're coming. And wear a face mask, if you have one.  ? If you have a face mask, wear it whenever you're around other people. It can help stop the spread of the virus when you cough or sneeze. ? Clean and disinfect your home every day. Use household  and disinfectant wipes or sprays. Take special care to clean things that you grab with your hands. These include doorknobs, remote controls, phones, and handles on your refrigerator and microwave. And don't forget countertops, tabletops, bathrooms, and computer keyboards. When to call for help  Call 911 anytime you think you may need emergency care. For example, call if:  · You have severe trouble breathing. (You can't talk at all.)  · You have constant chest pain or pressure. · You are severely dizzy or lightheaded. · You are confused or can't think clearly. · Your face and lips have a blue color. · You pass out (lose consciousness) or are very hard to wake up. Call your doctor now if you develop symptoms such as:  · Shortness of breath. · Fever. · Cough. If you need to get care, call ahead to the doctor's office for instructions before you go. Make sure you wear a face mask, if you have one, to prevent exposing other people to the virus. Where can you get the latest information? The following health organizations are tracking and studying this virus. Their websites contain the most up-to-date information.  Daija Re also learn what to do if you think you may have been exposed to the virus. · U.[place]. Centers for Disease Control and Prevention (CDC): The CDC provides updated news about the disease and travel advice. The website also tells you how to prevent the spread of infection. www.cdc.gov  · World Health Organization [facility]): WHO offers information about the virus outbreaks. WHO also has travel advice. www.who.int  Current as of: April 1, 2020               Content Version: 12.4  © 2006-2020 [employer]. Care instructions adapted under license by your healthcare professional. If you have questions about a medical condition or this instruction, always ask your healthcare professional. [place] [address] any warranty or liability for your use of this information. The discharge information has been reviewed with the patient and spouse. Any questions and concerns from the patient and spouse have been addressed. The patient and spouse verbalized understanding. The following personal items collected during your admission are returned to you:   Dental Appliance: Dental Appliances: None  Vision:    Hearing Aid:    Jewelry: Jewelry: None  Clothing: Clothing: Other (comment)(street cloths placed in belongings bag)  Other Valuables:  Other Valuables: Cell Phone  Valuables sent to safe:

## 2021-03-15 ENCOUNTER — TELEPHONE (OUTPATIENT)
Dept: ONCOLOGY | Age: 46
End: 2021-03-15

## 2021-03-15 NOTE — TELEPHONE ENCOUNTER
Called patient to go over covid screening questions. No answer. Left a voicemail and call back number.

## 2021-03-16 ENCOUNTER — OFFICE VISIT (OUTPATIENT)
Dept: ONCOLOGY | Age: 46
End: 2021-03-16
Payer: COMMERCIAL

## 2021-03-16 VITALS
BODY MASS INDEX: 32.65 KG/M2 | OXYGEN SATURATION: 95 % | SYSTOLIC BLOOD PRESSURE: 113 MMHG | RESPIRATION RATE: 16 BRPM | HEART RATE: 82 BPM | TEMPERATURE: 97.7 F | HEIGHT: 62 IN | DIASTOLIC BLOOD PRESSURE: 73 MMHG | WEIGHT: 177.4 LBS

## 2021-03-16 DIAGNOSIS — Z71.85 VACCINE COUNSELING: ICD-10-CM

## 2021-03-16 DIAGNOSIS — Z86.718 HISTORY OF DVT (DEEP VEIN THROMBOSIS): Primary | ICD-10-CM

## 2021-03-16 DIAGNOSIS — Z86.39 HISTORY OF IRON DEFICIENCY: ICD-10-CM

## 2021-03-16 DIAGNOSIS — Z87.42 HISTORY OF MENORRHAGIA: ICD-10-CM

## 2021-03-16 DIAGNOSIS — I82.A12 ACUTE DEEP VEIN THROMBOSIS (DVT) OF AXILLARY VEIN OF LEFT UPPER EXTREMITY (HCC): ICD-10-CM

## 2021-03-16 PROCEDURE — 99214 OFFICE O/P EST MOD 30 MIN: CPT | Performed by: INTERNAL MEDICINE

## 2021-03-16 NOTE — PROGRESS NOTES
[id number] [facility] at [facility]  [phone]    Hematology / Oncology Established Visit    Reason for Visit:   Crystal Felton is a 54 y.o. female who comes in for f/u of DVT and anemia. PCP is Dr. Chyna Conklin. History of Present Illness:   Monica Smith is a 54 y.o. female with h/o DVT comes in for management of acute DVT. Patient presented to [facility] ED on 10/30/19 with left arm pain, swelling and coldness (3 day h/o). Patient did have a prior DVT in LLE while 7 mo pregnant with her daughter 15 yrs ago. At that time, she was treated with Lovenox throughout most of the pregnancy, then transitioned to Heparin injections the last 3 weeks of pregnancy. After delivery, she was placed on Warfarin for 6 months. Given her prior h/o DVT during pregnancy approx 15 yrs ago, patient underwent ultrasound doppler exam, which was notable for left subclavian and axillary vein DVT. No recent surgeries, arm trauma, OCPs, 8 hr long trips. No recent infections. She had a corticosteroid injection in her C-spine region midline per patient. She does exercise regularly since June 2019. She was started on Xarelto. She also has a h/o tubal ligation performed 14 yrs ago. Prior to starting Xarelto, she had a few days of spotting. However, after starting Xarelto, she developed persistent heavy vaginal bleeding requiring 1 pad every hour. She was started on Megace by Gyn on 11/4/19. Her menstrual bleeding has decreased slightly, but still having clotting and bleeding, now requiring pad change every 2 hours. Mother had vascular problems including atherosclerosis, but did not have blood clots. Interval History: Patient here for follow up of DVT and anemia. Last Injectafer x 2 early December 2019. On 3/4/21, she underwent hysterectomy and has not had any further menstrual bleeding. She feels well. Is tolerating Eliquis well.      Past Medical History:   Diagnosis Date    Anxiety and depression     Arthritis hand, hips    Asthma     Chronic pain     lower back, neck, collarbone    DVT (deep vein thrombosis) in pregnancy 2004    L leg    DVT (deep venous thrombosis) ([facility] [address] 10/2019    L shoulder    Graves disease     Migraines     Neuropathy     SOB (shortness of breath)     r/t asthma    Tinnitus     in past      Past Surgical History:   Procedure Laterality Date    HX GYN      tubal ligation    HX HEENT      Multiple nasal polyp removal x2    HX WISDOM TEETH EXTRACTION        Social History     Tobacco Use    Smoking status: Never Smoker    Smokeless tobacco: Never Used   Substance Use Topics    Alcohol use: Yes     Alcohol/week: 3.0 standard drinks     Types: 1 Cans of beer, 1 Shots of liquor, 1 Standard drinks or equivalent per week     Comment: occasional      Family History   Problem Relation Age of Onset    Heart Disease Mother 77    Hypertension Mother     Hypertension Father     Heart Disease Father     Stroke Father      Current Outpatient Medications   Medication Sig    cyanocobalamin 1,000 mcg tablet Take 1,000 mcg by mouth daily. W/ methyl folate 400mcg  B6 1.5 mg    pregabalin (Lyrica) 75 mg capsule Take 75 mg by mouth three (3) times daily.  MILK THISTLE PO Take 600 mg by mouth daily.  horse chestnut seed (HORSE CHESTNUT PO) Take 250 mg by mouth daily.  OTHER Indications: DIM 25mg + Broccosinolates    glutamine (L-Glutamine) 500 mg tab Take 500 mg by mouth daily.  IODINE PO Take 9 mg by mouth Once every 2 weeks.  Boswellia tamia extract (BOSWELLIA TAMIA XT, BULK,) Take 307 mg by mouth as needed.  Eliquis 5 mg tablet TAKE 1 TABLET BY MOUTH TWICE A DAY    MAGNESIUM PO Take 300 mg by mouth.  turmeric (CURCUMIN) Take 1,160 mg by mouth daily.  SLIPPERY ELM BARK PO Take 800 mg by mouth daily.     cholecalciferol (VITAMIN D3) 5,000 unit capsule cholecalciferol (vitamin D3) 5,000 unit capsule    CANNABIDIOL, CBD, EXTRACT PO Take 30 mg by mouth two (2) times a day.    docusate sodium (COLACE) 50 mg capsule Take 1 Cap by mouth two (2) times a day for 90 days.  docusate sodium (COLACE) 100 mg capsule Take 100 mg by mouth daily. No current facility-administered medications for this visit. Allergies   Allergen Reactions    Quinine Nausea and Vomiting    Aspirin Shortness of Breath    Nsaids (Non-Steroidal Anti-Inflammatory Drug) Shortness of Breath        Review of Systems: A complete review of systems was obtained, negative except as described above. Physical Exam:     Visit Vitals  /73   Pulse 82   Temp 97.7 °F (36.5 °C)   Resp 16   Ht 5' 2\" (1.575 m)   Wt 177 lb 6.4 oz (80.5 kg)   SpO2 95%   BMI 32.45 kg/m²     ECOG PS: 0  General: Well developed, no acute distress  Eyes: PERRLA, EOMI, anicteric sclerae  HENT: Atraumatic, OP clear, TMs intact without erythema  Neck: Supple  Lymphatic: No cervical, supraclavicular, axillary or inguinal adenopathy  Respiratory: CTAB, normal respiratory effort  CV: Normal rate, regular rhythm, no murmurs. Left arm prior swelling improved. GI: Soft, nontender, nondistended, no masses, no hepatomegaly, no splenomegaly  MS: Normal gait and station. Digits without clubbing or cyanosis. Skin: No rashes, ecchymoses, or petechiae. Normal temperature, turgor, and texture. Neuro/Psych: Alert, oriented. 5/5 strength in all 4 extremities. Appropriate affect, normal judgment/insight. Results:     Lab Results   Component Value Date/Time    WBC 7.6 02/23/2021 09:52 AM    HGB 15.8 02/23/2021 09:52 AM    HCT 46.2 02/23/2021 09:52 AM    PLATELET 925 54/31/4296 09:52 AM    MCV 85.7 02/23/2021 09:52 AM    ABS.  NEUTROPHILS 3.9 02/23/2021 09:52 AM     Lab Results   Component Value Date/Time    Sodium 141 12/31/2019 08:05 AM    Potassium 3.8 12/31/2019 08:05 AM    Chloride 112 (H) 12/31/2019 08:05 AM    CO2 19 (L) 12/31/2019 08:05 AM    Glucose 82 12/31/2019 08:05 AM    BUN 8 12/31/2019 08:05 AM    Creatinine 0.84 12/31/2019 08:05 AM    GFR est AA 97 12/31/2019 08:05 AM    GFR est non-AA 84 12/31/2019 08:05 AM    Calcium 8.8 12/31/2019 08:05 AM     Lab Results   Component Value Date/Time    Bilirubin, total 0.3 12/31/2019 08:05 AM    ALT (SGPT) 33 (H) 12/31/2019 08:05 AM    Alk. phosphatase 56 12/31/2019 08:05 AM    Protein, total 6.5 12/31/2019 08:05 AM    Protein, total 6.8 12/31/2019 08:05 AM    Albumin 4.1 12/31/2019 08:05 AM    Albumin 4.0 12/31/2019 08:05 AM    Globulin 2.4 12/31/2019 08:05 AM     Lab Results   Component Value Date/Time    Iron 116 02/22/2021 12:16 PM    Iron binding capacity 324 02/22/2021 12:16 PM    Ferritin 149 02/22/2021 12:16 PM       Lab Results   Component Value Date/Time    Vitamin B12 778 12/31/2019 08:05 AM    Folate 19.3 12/31/2019 08:05 AM     Lab Results   Component Value Date/Time    TSH 1.13 11/14/2019 12:00 AM     No results found for: HAMAT, HAAB, HABT, HAAT, HBSAG, HBSB, HBSAT, [address] [facility], HBCM, [facility] 69, HBCAT, [address] [facility], [address] [facility], [address] [place] [address], [address] [place] [address], [address] [facility], [address] [place] [address], [address] [place] [address], [address] [place] [address], [id number], [id number], [address] [place] [address], [id number], HBCMLT, [id number], HCGAT      Imaging:     10/30/19 upper extremity doppler: Left upper extremity venous duplex is positive for acute occlusive deep venous thrombosis involving subclavian and axillary veins. Right common femoral vein is thrombus free. Assessment & Plan:   Monica Delgadillo is a 54 y.o. female comes in with DVT. 1. LUE DVT: Acute DVT involving left subclavian and axillary veins. This is an unprovoked event and her 2nd lifetime thrombotic event, which will require lifelong anticoagulation. I discussed risks/benefits of continuing on anticoagulation indefinitely. Given past menorrhagia, switched from xarelto to Eliquis which has a slightly lower risk of bleeding.   -- Continue anticoagulation indefinitely  -- Eliquis 5mg bid. -- Follow up as needed. 2. H/o LLE DVT: Attributed to pregnancy and completed 6 months of anticoagulation at that time. No family h/o thrombosis. 3. Hx menorrhagia: Had tried Norethindrone (Progesterone like med) for this, but then stopped after hysterectomy. S/p hysterectomy 3/4/21.     4. H/o Iron deficiency anemia: Now improved s/p Injectafer in 12/2019 and with resolution of menorrhagia. -- Can monitor with PCP since Hgb in normal range. Urged pt to return if she becomes anemic again in future. 5. COVID vaccine counseling: I recommend proceeding with vaccination when possible. Anticoagulation is not a contraindication from my standpoint. I appreciate the opportunity to participate in Ms. Monica Arias's care.     Signed By: Vladimir Sheffield MD     March 16, 2021

## 2021-03-16 NOTE — PROGRESS NOTES
Chief Complaint   Patient presents with   Pat Zane is a pleasant 54year old woman who presents today as a follow up for anemia. She reports pain due from her fibromyalgia.

## 2021-03-17 DIAGNOSIS — Z86.39 HISTORY OF IRON DEFICIENCY: ICD-10-CM

## 2021-03-17 DIAGNOSIS — Z86.718 HISTORY OF DVT (DEEP VEIN THROMBOSIS): ICD-10-CM

## 2021-03-31 ENCOUNTER — OFFICE VISIT (OUTPATIENT)
Dept: INTERNAL MEDICINE CLINIC | Age: 46
End: 2021-03-31
Payer: COMMERCIAL

## 2021-03-31 VITALS
OXYGEN SATURATION: 96 % | HEART RATE: 78 BPM | TEMPERATURE: 98.5 F | HEIGHT: 62 IN | WEIGHT: 173.2 LBS | RESPIRATION RATE: 14 BRPM | BODY MASS INDEX: 31.87 KG/M2 | DIASTOLIC BLOOD PRESSURE: 68 MMHG | SYSTOLIC BLOOD PRESSURE: 100 MMHG

## 2021-03-31 DIAGNOSIS — E55.9 VITAMIN D DEFICIENCY: ICD-10-CM

## 2021-03-31 DIAGNOSIS — E53.8 VITAMIN B 12 DEFICIENCY: ICD-10-CM

## 2021-03-31 DIAGNOSIS — Z86.718 HISTORY OF DVT (DEEP VEIN THROMBOSIS): ICD-10-CM

## 2021-03-31 DIAGNOSIS — T50.Z95A ADVERSE EFFECT OF VACCINE, INITIAL ENCOUNTER: Primary | ICD-10-CM

## 2021-03-31 DIAGNOSIS — Z00.00 ROUTINE ADULT HEALTH MAINTENANCE: ICD-10-CM

## 2021-03-31 PROCEDURE — 99214 OFFICE O/P EST MOD 30 MIN: CPT | Performed by: INTERNAL MEDICINE

## 2021-03-31 NOTE — PROGRESS NOTES
Assessment and Plan   Diagnoses and all orders for this visit:    1. Adverse effect of vaccine, initial encounter  Received her first dose of <oderna yesterday. After about 30 minutes, she developed a tingling sensation around her neck. Noted some mild throat tightening. Feeling flushed. No rash, difficulty breathing, anxiety, abdominal pain, nausea, vomiting. She was given Benadryl which did not significantly improve her symptoms. Blood pressure was high at that time. She also developed a headache a few hours after her vaccine. Symptoms not anaphylaxis. Recommend getting her second Covid vaccine. Recommend waiting 30 minutes, bringing someone with her, and premedicating with Benadryl. 2. History of DVT (deep vein thrombosis)  -     apixaban (Eliquis) 5 mg tablet; TAKE 1 TABLET BY MOUTH TWICE A DAY  Unprovoked. On indefinite anticoagulation. No current issues. Continue medication. No changes recommended. 3. Vitamin B 12 deficiency  -     VITAMIN B12; Future  -     VITAMIN B12; Future    4. Vitamin D deficiency  -     VITAMIN D, 25 HYDROXY; Future  -     VITAMIN D, 25 HYDROXY; Future    5. Routine adult health maintenance  -     CBC WITH AUTOMATED DIFF; Future  -     HEMOGLOBIN A1C WITH EAG; Future  -     METABOLIC PANEL, COMPREHENSIVE; Future  -     LIPID PANEL; Future  -     HEPATITIS C AB; Future  -     HEMOGLOBIN A1C WITH EAG; Future  -     METABOLIC PANEL, COMPREHENSIVE; Future  -     LIPID PANEL; Future    Of note, she has a breast biopsy planned next week. Benefits, risks, possible drug interactions, and side effects of all new medications were reviewed with the patient. Pt verbalized understanding. Return to clinic: Earliest convenience for physical.  Patient will get labs at [address] was used to authenticate this note.   Laxmi Foster MD  [facility]  3/31/2021    Future Appointments   Date Time Provider Department [place]   5/26/3621  3:72 AM Richy Del Toro MD [facility] AMB        Subjective   Chief Complaint   Vaccine reaction    Monica Maldonado is a 54 y.o. female           Objective   Vitals:       Visit Vitals  /68 (BP 1 Location: Left upper arm, BP Patient Position: Sitting, BP Cuff Size: Adult)   Pulse 78   Temp 98.5 °F (36.9 °C) (Oral)   Resp 14   Ht 5' 2\" (1.575 m)   Wt 173 lb 3.2 oz (78.6 kg)   LMP 01/10/2020 (Exact Date)   SpO2 96%   BMI 31.68 kg/m²        Physical Exam  Constitutional:       Appearance: Normal appearance. She is not ill-appearing. Cardiovascular:      Rate and Rhythm: Normal rate and regular rhythm. Heart sounds: No murmur. No friction rub. No gallop. Pulmonary:      Effort: No respiratory distress. Breath sounds: Normal breath sounds. No wheezing, rhonchi or rales. Neurological:      Mental Status: She is alert. Current Outpatient Medications   Medication Sig    apixaban (Eliquis) 5 mg tablet TAKE 1 TABLET BY MOUTH TWICE A DAY    cyanocobalamin 1,000 mcg tablet Take 1,000 mcg by mouth daily. W/ methyl folate 400mcg  B6 1.5 mg    pregabalin (Lyrica) 75 mg capsule Take 75 mg by mouth three (3) times daily.  MILK THISTLE PO Take 600 mg by mouth daily.  horse chestnut seed (HORSE CHESTNUT PO) Take 250 mg by mouth daily.  OTHER Indications: DIM 25mg + Broccosinolates    glutamine (L-Glutamine) 500 mg tab Take 500 mg by mouth daily.  IODINE PO Take 9 mg by mouth Once every 2 weeks.  Boswellia tamia extract (BOSWELLIA TAMIA XT, BULK,) Take 307 mg by mouth as needed.  MAGNESIUM PO Take 300 mg by mouth.  turmeric (CURCUMIN) Take 1,160 mg by mouth daily.  SLIPPERY ELM BARK PO Take 800 mg by mouth daily.  cholecalciferol (VITAMIN D3) 5,000 unit capsule cholecalciferol (vitamin D3) 5,000 unit capsule    CANNABIDIOL, CBD, EXTRACT PO Take 50 mg by mouth two (2) times a day.     docusate sodium (COLACE) 50 mg capsule Take 1 Cap by mouth two (2) times a day for 90 days.  docusate sodium (COLACE) 100 mg capsule Take 100 mg by mouth daily. No current facility-administered medications for this visit.

## 2021-04-02 LAB
25(OH)D3 SERPL-MCNC: 70 NG/ML (ref 30–100)
ALB/GLOBRATIO, 58C: 1.6 (CALC) (ref 1–2.5)
ALBUMIN SERPL-MCNC: 4.2 G/DL (ref 3.6–5.1)
ALKALINE PHOSPHATASE, TOTAL, 25002000: 58 U/L (ref 31–125)
ALT SERPL-CCNC: 20 U/L (ref 6–29)
AST SERPL W P-5'-P-CCNC: 18 U/L (ref 10–35)
BILIRUB SERPL-MCNC: 0.6 MG/DL (ref 0.2–1.2)
BUN SERPL-MCNC: 12 MG/DL (ref 7–25)
BUN/CREATININE RATIO,BUCR: NORMAL (CALC) (ref 6–22)
CALCIUM SERPL-MCNC: 9.3 MG/DL (ref 8.6–10.2)
CHLORIDE SERPL-SCNC: 105 MMOL/L (ref 98–110)
CHOL/HDL RATIO,CHHDX: 3.6 (CALC)
CHOLEST SERPL-MCNC: 183 MG/DL
CO2 SERPL-SCNC: 25 MMOL/L (ref 20–32)
CREAT SERPL-MCNC: 0.95 MG/DL (ref 0.5–1.1)
EAG (MG/DL),9916804: 91 (CALC)
EAG (MMOL/L),9916805: 5 (CALC)
GLOBULIN,GLOB: 2.6 G/DL (CALC) (ref 1.9–3.7)
GLUCOSE SERPL-MCNC: 91 MG/DL (ref 65–99)
HBA1C MFR BLD HPLC: 4.8 % OF TOTAL HGB
HDLC SERPL-MCNC: 51 MG/DL
LDL-CHOLESTEROL: 117 MG/DL (CALC)
NON-HDL CHOLESTEROL, 011976: 132 MG/DL (CALC)
POTASSIUM SERPL-SCNC: 4 MMOL/L (ref 3.5–5.3)
PROT SERPL-MCNC: 6.8 G/DL (ref 6.1–8.1)
SODIUM SERPL-SCNC: 137 MMOL/L (ref 135–146)
TRIGL SERPL-MCNC: 61 MG/DL (ref ?–150)
VIT B12 SERPL-MCNC: 931 PG/ML (ref 200–1100)

## 2021-04-03 NOTE — PROGRESS NOTES
[employer] message sent. A1c normal.  CMP normal.  Vitamin D normal.  B12 normal.  .  =  Your test results are normal except for the following: Your cholesterol is mildly elevated. However, you do not need medications for this. The best way to manage your cholesterol is through diet and exercise. The Mediterranean diet is a good guide to healthy eating.

## 2021-04-12 NOTE — PROGRESS NOTES
Assessment and Plan   Diagnoses and all orders for this visit:    1. Well adult exam  3. Abnormal mammogram  Encouraged healthy habits. Getting her Covid vaccine second dose soon. Up-to-date on other vaccines. Recently had a breast biopsy for an abnormal mammogram which was normal.    2. Need for hepatitis C screening test  -     HEPATITIS C AB; Future    4. Scar  Was scratched by a cat that has left a scar on her right upper arm. Likely small keloid. Advised to monitor for changes consistent with possible cancer, but this is likely a scar. 5. Mild intermittent asthma without complication  Has baseline shortness of breath ever since she was a child. Was previously on maintenance inhalers but she does not think her symptoms are severe enough that she wants to use it. Albuterol as needed. 6. Fibromyalgia  Followed by DR. Mandujano on lyrica    7. Carpal tunnel syndrome of right wrist  Considering surgery. Recommended wrist splints    Benefits, risks, possible drug interactions, and side effects of all new medications were reviewed with the patient. Pt verbalized understanding. Return to clinic: 1 year for physical or earlier if needed    An electronic signature was used to authenticate this note. Wing Marisela MD  [facility]  4/13/2021    No future appointments. History of Present Illness   Chief Complaint   Physical    Monica Garcia is a 54 y.o. female         Review of Systems   Constitutional: Negative for chills and fever. HENT: Negative for hearing loss. Eyes: Negative for blurred vision. Respiratory: Positive for shortness of breath. Cardiovascular: Negative for chest pain. Gastrointestinal: Negative for abdominal pain, blood in stool, constipation, diarrhea, melena, nausea and vomiting. Genitourinary: Negative for dysuria and hematuria. Musculoskeletal: Negative for joint pain. Skin: Negative for rash.    Neurological: Negative for headaches. Past Medical History     Allergies   Allergen Reactions    Quinine Nausea and Vomiting    Aspirin Shortness of Breath    Nsaids (Non-Steroidal Anti-Inflammatory Drug) Shortness of Breath        Current Outpatient Medications   Medication Sig    albuterol (PROVENTIL HFA, VENTOLIN HFA, PROAIR HFA) 90 mcg/actuation inhaler Take  by inhalation.  apixaban (Eliquis) 5 mg tablet TAKE 1 TABLET BY MOUTH TWICE A DAY    cyanocobalamin 1,000 mcg tablet Take 1,000 mcg by mouth daily. W/ methyl folate 400mcg B6 1.5 mg  otc    pregabalin (Lyrica) 75 mg capsule Take 75 mg by mouth three (3) times daily.  MILK THISTLE PO Take 600 mg by mouth daily.  horse chestnut seed (HORSE CHESTNUT PO) Take 250 mg by mouth daily.  OTHER Indications: DIM 25mg + Broccosinolates    glutamine (L-Glutamine) 500 mg tab Take 500 mg by mouth daily.  IODINE PO Take 9 mg by mouth Once every 2 weeks.  Boswellia tamia extract (BOSWELLIA TAMIA XT, BULK,) Take 307 mg by mouth as needed.  MAGNESIUM PO Take 300 mg by mouth.  turmeric (CURCUMIN) Take 1,160 mg by mouth daily.  SLIPPERY ELM BARK PO Take 800 mg by mouth daily.  cholecalciferol (VITAMIN D3) 5,000 unit capsule otc    CANNABIDIOL, CBD, EXTRACT PO Take 50 mg by mouth two (2) times a day. No current facility-administered medications for this visit.            Patient Active Problem List   Diagnosis Code    DVT (deep [facility] thrombosis) ([facility]) I82.409    Asthma J45.909    Fibromyalgia M79.7    Menorrhagia with regular cycle N92.0    Iron deficiency anemia due to chronic blood loss D50.0    Arthritis M19.90    History of Graves' disease Z86.39    Globus sensation R09.89    Abnormal mammogram R92.8     Past Surgical History:   Procedure Laterality Date    HX GYN  2005    tubal ligation    HX HEENT      Multiple nasal polyp removal x2    HX HYSTERECTOMY  03/04/2021    HX WISDOM TEETH EXTRACTION        Social History     Tobacco Use  Smoking status: Never Smoker    Smokeless tobacco: Never Used   Substance Use Topics    Alcohol use: Not Currently     Comment: 2 drinks/week      Family History   Problem Relation Age of Onset    Heart Disease Mother 77    Hypertension Mother     Hypertension Father     Heart Disease Father     Stroke Father         Physical Exam   Vitals:       Visit Vitals  /80 (BP 1 Location: Left upper arm, BP Patient Position: Sitting, BP Cuff Size: Adult)   Pulse 70   Temp 98 °F (36.7 °C) (Oral)   Resp 16   Ht 5' 2\" (1.575 m)   Wt 175 lb (79.4 kg)   LMP 01/10/2020 (Exact Date)   SpO2 96%   BMI 32.01 kg/m²        Physical Exam  Constitutional:       General: She is not in acute distress. Appearance: She is well-developed. HENT:      Right Ear: Tympanic membrane, ear canal and external ear normal.      Left Ear: Tympanic membrane, ear canal and external ear normal.   Eyes:      Extraocular Movements: Extraocular movements intact. Conjunctiva/sclera: Conjunctivae normal.   Neck:      Musculoskeletal: Neck supple. Cardiovascular:      Rate and Rhythm: Normal rate and regular rhythm. Pulses: Normal pulses. Heart sounds: No murmur. No friction rub. No gallop. Pulmonary:      Effort: No respiratory distress. Breath sounds: No wheezing, rhonchi or rales. Abdominal:      General: Bowel sounds are normal. There is no distension. Palpations: Abdomen is soft. There is no hepatomegaly, splenomegaly or mass. Tenderness: There is no abdominal tenderness. There is no guarding. Skin:     General: Skin is warm. Findings: No rash. Comments: Right upper inner arm 5mm slight pink, shiny, hyperpigmented raised linear papule. Regular border, homogenous coloring   Neurological:      Mental Status: She is alert.

## 2021-04-13 ENCOUNTER — OFFICE VISIT (OUTPATIENT)
Dept: INTERNAL MEDICINE CLINIC | Age: 46
End: 2021-04-13
Payer: COMMERCIAL

## 2021-04-13 VITALS
HEIGHT: 62 IN | WEIGHT: 175 LBS | TEMPERATURE: 98 F | OXYGEN SATURATION: 96 % | RESPIRATION RATE: 16 BRPM | BODY MASS INDEX: 32.2 KG/M2 | DIASTOLIC BLOOD PRESSURE: 80 MMHG | HEART RATE: 70 BPM | SYSTOLIC BLOOD PRESSURE: 120 MMHG

## 2021-04-13 DIAGNOSIS — R92.8 ABNORMAL MAMMOGRAM: ICD-10-CM

## 2021-04-13 DIAGNOSIS — J45.20 MILD INTERMITTENT ASTHMA WITHOUT COMPLICATION: ICD-10-CM

## 2021-04-13 DIAGNOSIS — Z00.00 WELL ADULT EXAM: Primary | ICD-10-CM

## 2021-04-13 DIAGNOSIS — Z11.59 NEED FOR HEPATITIS C SCREENING TEST: ICD-10-CM

## 2021-04-13 DIAGNOSIS — G56.01 CARPAL TUNNEL SYNDROME OF RIGHT WRIST: ICD-10-CM

## 2021-04-13 DIAGNOSIS — M79.7 FIBROMYALGIA: ICD-10-CM

## 2021-04-13 DIAGNOSIS — L90.5 SCAR: ICD-10-CM

## 2021-04-13 LAB
HCV AB SERPL QL IA: NONREACTIVE
HCV COMMENT,HCGAC: NORMAL

## 2021-04-13 PROCEDURE — 99396 PREV VISIT EST AGE 40-64: CPT | Performed by: INTERNAL MEDICINE

## 2021-04-13 RX ORDER — ALBUTEROL SULFATE 90 UG/1
AEROSOL, METERED RESPIRATORY (INHALATION)
COMMUNITY
End: 2022-05-25

## 2022-01-04 DIAGNOSIS — I82.A12 ACUTE DEEP VEIN THROMBOSIS (DVT) OF AXILLARY VEIN OF LEFT UPPER EXTREMITY (HCC): Primary | ICD-10-CM

## 2022-03-18 PROBLEM — R92.8 ABNORMAL MAMMOGRAM: Status: ACTIVE | Noted: 2021-04-13

## 2022-03-18 PROBLEM — M79.7 FIBROMYALGIA: Status: ACTIVE | Noted: 2019-11-14

## 2022-03-18 PROBLEM — I82.409 DVT (DEEP VENOUS THROMBOSIS) (HCC): Status: ACTIVE | Noted: 2019-11-14

## 2022-03-19 PROBLEM — Z86.39 HISTORY OF GRAVES' DISEASE: Status: ACTIVE | Noted: 2019-11-26

## 2022-03-19 PROBLEM — R09.89 GLOBUS SENSATION: Status: ACTIVE | Noted: 2020-02-13

## 2022-03-19 PROBLEM — M19.90 ARTHRITIS: Status: ACTIVE | Noted: 2019-11-26

## 2022-03-19 PROBLEM — J45.909 ASTHMA: Status: ACTIVE | Noted: 2019-11-14

## 2022-03-20 PROBLEM — D50.0 IRON DEFICIENCY ANEMIA DUE TO CHRONIC BLOOD LOSS: Status: ACTIVE | Noted: 2019-11-25

## 2022-03-20 PROBLEM — N92.0 MENORRHAGIA WITH REGULAR CYCLE: Status: ACTIVE | Noted: 2019-11-14

## 2022-05-25 ENCOUNTER — OFFICE VISIT (OUTPATIENT)
Dept: INTERNAL MEDICINE CLINIC | Age: 47
End: 2022-05-25
Payer: COMMERCIAL

## 2022-05-25 VITALS
TEMPERATURE: 97.3 F | WEIGHT: 182 LBS | HEIGHT: 62 IN | SYSTOLIC BLOOD PRESSURE: 127 MMHG | RESPIRATION RATE: 14 BRPM | OXYGEN SATURATION: 97 % | DIASTOLIC BLOOD PRESSURE: 80 MMHG | BODY MASS INDEX: 33.49 KG/M2 | HEART RATE: 71 BPM

## 2022-05-25 DIAGNOSIS — M79.7 FIBROMYALGIA: ICD-10-CM

## 2022-05-25 DIAGNOSIS — I82.A12 ACUTE DEEP VEIN THROMBOSIS (DVT) OF AXILLARY VEIN OF LEFT UPPER EXTREMITY (HCC): ICD-10-CM

## 2022-05-25 DIAGNOSIS — Z00.00 WELL ADULT EXAM: Primary | ICD-10-CM

## 2022-05-25 PROCEDURE — 99396 PREV VISIT EST AGE 40-64: CPT | Performed by: INTERNAL MEDICINE

## 2022-05-25 RX ORDER — AMITRIPTYLINE HYDROCHLORIDE 10 MG/1
TABLET, FILM COATED ORAL
COMMUNITY
Start: 2022-04-25 | End: 2022-05-25

## 2022-05-25 RX ORDER — AMITRIPTYLINE HYDROCHLORIDE 10 MG/1
30 TABLET, FILM COATED ORAL
COMMUNITY

## 2022-05-25 NOTE — PROGRESS NOTES
Assessment and Plan     1. Well adult exam  Assessment & Plan:  Planning to move to [place] - I can cover her meds until she establishes with someone there  Daughter graduated [employer] got    Sold house   rec getting colonoscopy once she's settled in [place]  Also rec PNA vaccine  Encourage healthy habits  Orders:  -     LIPID PANEL; Future  2. Acute deep vein thrombosis (DVT) of axillary vein of left upper extremity (HCC)  Assessment & Plan:   well controlled, continue current medications    xarelto 10  subclavian and axillary veins 10/2019, unprovoked; hx of LLE DVT in setting of pregnancy; rec lifelong anticoagulation  3. Fibromyalgia  Assessment & Plan:   monitored by specialist. No acute findings meriting change in the plan   Fibro, maybe anklyosing spondylitis  Dr. Leandra Hastings  amitrip 30       Benefits, risks, possible drug interactions, and side effects of all new medications were reviewed with the patient. Pt verbalized understanding. Return to clinic:  As needed - pt planning to move to [facility]    An electronic signature was used to authenticate this note. Srini Harrington MD  [facility]  5/25/2022    No future appointments. History of Present Illness   Chief Complaint   physical    Monica Gandara is a 49 y.o. female         Review of Systems   Constitutional: Negative for chills and fever. HENT: Negative for hearing loss. Eyes: Negative for blurred vision. Respiratory: Negative for shortness of breath. Cardiovascular: Negative for chest pain. Gastrointestinal: Negative for abdominal pain, blood in stool, constipation, diarrhea, melena, nausea and vomiting. Genitourinary: Negative for dysuria and hematuria. Musculoskeletal: Negative for joint pain. Skin: Negative for rash. Neurological: Negative for headaches.         Past Medical History     Allergies   Allergen Reactions    Quinine Nausea and Vomiting    Aspirin Shortness of Breath    Nsaids (Non-Steroidal Anti-Inflammatory Drug) Shortness of Breath        Current Outpatient Medications   Medication Sig    amitriptyline (ELAVIL) 10 mg tablet Take 30 mg by mouth nightly. Total 30mg    rivaroxaban (Xarelto) 10 mg tablet Take 1 Tablet by mouth daily (with dinner). Indications: prevent blood clots    IODINE PO Take 9 mg by mouth Once every 2 weeks.  MAGNESIUM PO Take 300 mg by mouth.  turmeric (CURCUMIN) Take 1,160 mg by mouth daily.  SLIPPERY ELM BARK PO Take 800 mg by mouth daily.  cholecalciferol (VITAMIN D3) 5,000 unit capsule otc     No current facility-administered medications for this visit.           Patient Active Problem List   Diagnosis Code    DVT (deep venous thrombosis) ([facility]) I82.409    Asthma J45.909    Fibromyalgia M79.7    Menorrhagia with regular cycle N92.0    Iron deficiency anemia due to chronic blood loss D50.0    Arthritis M19.90    History of Graves' disease Z86.39    Globus sensation R09.89    Abnormal mammogram R92.8    Well adult exam Z00.00     Past Surgical History:   Procedure Laterality Date    HX GYN  2005    tubal ligation    HX HEENT      Multiple nasal polyp removal x2    HX HYSTERECTOMY  03/04/2021    HX WISDOM TEETH EXTRACTION        Social History     Tobacco Use    Smoking status: Never Smoker    Smokeless tobacco: Never Used   Substance Use Topics    Alcohol use: Yes     Comment: 2 drinks/week      Family History   Problem Relation Age of Onset    Heart Disease Mother 77    Hypertension Mother     Hypertension Father     Heart Disease Father     Stroke Father         Physical Exam   Vitals:       Visit Vitals  /80 (BP 1 Location: Left upper arm, BP Patient Position: Sitting, BP Cuff Size: Adult)   Pulse 71   Temp 97.3 °F (36.3 °C) (Oral)   Resp 14   Ht 5' 2\" (1.575 m)   Wt 182 lb (82.6 kg)   LMP 01/10/2020 (Exact Date)   SpO2 97%   BMI 33.29 kg/m²        Physical Exam  Constitutional: General: She is not in acute distress. Appearance: Normal appearance. She is well-developed. She is not ill-appearing. HENT:      Right Ear: Tympanic membrane, ear canal and external ear normal.      Left Ear: Tympanic membrane, ear canal and external ear normal.   Eyes:      Extraocular Movements: Extraocular movements intact. Conjunctiva/sclera: Conjunctivae normal.   Cardiovascular:      Rate and Rhythm: Normal rate and regular rhythm. Pulses: Normal pulses. Heart sounds: No murmur heard. No friction rub. No gallop. Pulmonary:      Effort: No respiratory distress. Breath sounds: Normal breath sounds. No wheezing, rhonchi or rales. Abdominal:      General: Bowel sounds are normal. There is no distension. Palpations: Abdomen is soft. There is no hepatomegaly, splenomegaly or mass. Tenderness: There is no abdominal tenderness. There is no guarding. Musculoskeletal:      Cervical back: Neck supple. Right lower leg: No edema. Left lower leg: No edema. Lymphadenopathy:      Cervical: No cervical adenopathy. Skin:     General: Skin is warm. Findings: No rash. Neurological:      Mental Status: She is alert.

## 2022-05-26 NOTE — ASSESSMENT & PLAN NOTE
monitored by specialist. No acute findings meriting change in the plan   Fibro, maybe anklyosing spondylitis  Dr. Fidel Hurst 30

## 2022-05-26 NOTE — ASSESSMENT & PLAN NOTE
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

## 2022-06-09 LAB
CHOL/HDL RATIO,CHHDX: 3.2 (CALC)
CHOLEST SERPL-MCNC: 168 MG/DL
HDLC SERPL-MCNC: 52 MG/DL
LDL-CHOLESTEROL: 102 MG/DL (CALC)
NON-HDL CHOLESTEROL, 011976: 116 MG/DL (CALC)
TRIGL SERPL-MCNC: 48 MG/DL (ref ?–150)

## 2022-06-24 PROBLEM — Z00.00 WELL ADULT EXAM: Status: RESOLVED | Noted: 2022-05-25 | Resolved: 2022-06-24

## 2022-08-02 DIAGNOSIS — M19.90 ARTHRITIS: Primary | ICD-10-CM

## 2023-01-05 DIAGNOSIS — I82.A12 ACUTE DEEP VEIN THROMBOSIS (DVT) OF AXILLARY VEIN OF LEFT UPPER EXTREMITY (HCC): ICD-10-CM

## 2023-01-06 RX ORDER — RIVAROXABAN 10 MG/1
TABLET, FILM COATED ORAL
Qty: 90 TABLET | Refills: 3 | Status: SHIPPED | OUTPATIENT
Start: 2023-01-06

## 2023-02-23 NOTE — ED NOTES
Patient discharged by MD. Discharge papers signed, dated and timed/e-signature filed. Papers given and questions answered. Home with family. Vertigo

## (undated) DEVICE — SYR 10ML LUER LOK 1/5ML GRAD --

## (undated) DEVICE — POWDER HEMOSTAT GEL 3.0GR -- SURGICEL

## (undated) DEVICE — REM POLYHESIVE ADULT PATIENT RETURN ELECTRODE: Brand: VALLEYLAB

## (undated) DEVICE — SOL IRR SOD CL 0.9% 1000ML BTL --

## (undated) DEVICE — MARYLAND JAW LAPAROSCOPIC SEALER/DIVIDER COATED: Brand: LIGASURE

## (undated) DEVICE — Device

## (undated) DEVICE — NEEDLE HYPO 22GA L1.5IN BLK S STL HUB POLYPR SHLD REG BVL

## (undated) DEVICE — BNDG ADH FABRIC 2X4IN ST LF --

## (undated) DEVICE — CANISTER, RIGID, 3000CC: Brand: MEDLINE INDUSTRIES, INC.

## (undated) DEVICE — 3M™ IOBAN™ 2 ANTIMICROBIAL INCISE DRAPE 6650EZ: Brand: IOBAN™ 2

## (undated) DEVICE — COVER LT HNDL PLAS RIG 1 PER PK

## (undated) DEVICE — CLICKLINE SCISSORS INSERT: Brand: CLICKLINE

## (undated) DEVICE — TROCAR: Brand: KII SLEEVE

## (undated) DEVICE — SHEET,DRAPE,UNDERBUTTOCK,GRAD POUCH,PORT: Brand: MEDLINE

## (undated) DEVICE — TOTAL TRAY, 16FR 10ML SIL FOLEY, URN: Brand: MEDLINE

## (undated) DEVICE — PREP SKN CHLRAPRP APL 26ML STR --

## (undated) DEVICE — TRAY PREP DRY W/ PREM GLV 2 APPL 6 SPNG 2 UNDPD 1 OVERWRAP

## (undated) DEVICE — PAD,SANITARY,11 IN,MAXI,N-STRL,IND WRAP: Brand: MEDLINE

## (undated) DEVICE — VISUALIZATION SYSTEM: Brand: CLEARIFY

## (undated) DEVICE — GOWN,SIRUS,NONRNF,SETINSLV,XL,20/CS: Brand: MEDLINE

## (undated) DEVICE — STERILE POLYISOPRENE POWDER-FREE SURGICAL GLOVES WITH EMOLLIENT COATING: Brand: PROTEXIS

## (undated) DEVICE — DRAPE,REIN 53X77,STERILE: Brand: MEDLINE

## (undated) DEVICE — INFECTION CONTROL KIT SYS

## (undated) DEVICE — TROCAR: Brand: KII OPTICAL ACCESS SYSTEM

## (undated) DEVICE — SUTURE MCRYL SZ 4-0 L27IN ABSRB UD L19MM PS-2 1/2 CIR PRIM Y426H

## (undated) DEVICE — STERILE POLYISOPRENE POWDER-FREE SURGICAL GLOVES: Brand: PROTEXIS

## (undated) DEVICE — SURGICEL ENDOSCP APPL

## (undated) DEVICE — PAD POSITIONING WNG STD KIT W/BODY STRP LF DISP

## (undated) DEVICE — STRIP SKIN CLSR W0.25XL4IN WHT SPUNBOUND FBR NYL HI ADH

## (undated) DEVICE — TUBING FLTR PLUME AWAY EVAC W/ SUCT DEV DISP PUREVIEW

## (undated) DEVICE — INSUFFLATION NEEDLE: Brand: SURGINEEDLE

## (undated) DEVICE — SUTURE VLOC 90 2/0 VL 6 GS-22 VLOCM2105

## (undated) DEVICE — VCARE MEDIUM, UTERINE MANIPULATOR, VAGINAL-CERVICAL-AHLUWALIA'S-RETRACTOR-ELEVATOR: Brand: VCARE

## (undated) DEVICE — SURGICAL PROCEDURE PACK GYN LAPAROSCOPY CUST SMH LF